# Patient Record
Sex: FEMALE | Race: WHITE | Employment: FULL TIME
[De-identification: names, ages, dates, MRNs, and addresses within clinical notes are randomized per-mention and may not be internally consistent; named-entity substitution may affect disease eponyms.]

---

## 2017-01-26 DIAGNOSIS — N95.1 VAGINAL DRYNESS, MENOPAUSAL: Primary | ICD-10-CM

## 2017-01-26 RX ORDER — ESTRADIOL 0.1 MG/G
1 CREAM VAGINAL SEE ADMIN INSTRUCTIONS
Qty: 1 TUBE | Refills: 2 | Status: SHIPPED | OUTPATIENT
Start: 2017-01-26 | End: 2017-09-18 | Stop reason: SDUPTHER

## 2017-02-17 ENCOUNTER — TELEPHONE (OUTPATIENT)
Dept: OBGYN | Facility: CLINIC | Age: 47
End: 2017-02-17

## 2017-02-20 DIAGNOSIS — N95.1 MENOPAUSAL SYMPTOMS: Primary | ICD-10-CM

## 2017-02-20 DIAGNOSIS — R23.2 HOT FLASHES: ICD-10-CM

## 2017-02-23 ENCOUNTER — HOSPITAL ENCOUNTER (OUTPATIENT)
Age: 47
Setting detail: SPECIMEN
Discharge: HOME | End: 2017-02-23
Payer: COMMERCIAL

## 2017-02-23 DIAGNOSIS — N95.1 MENOPAUSAL SYMPTOMS: ICD-10-CM

## 2017-02-23 DIAGNOSIS — R23.2 HOT FLASHES: ICD-10-CM

## 2017-02-23 DIAGNOSIS — Z00.00 WELL ADULT EXAM: ICD-10-CM

## 2017-02-23 LAB
ABSOLUTE EOS #: 0.1 K/UL (ref 0–0.4)
ABSOLUTE LYMPH #: 2.2 K/UL (ref 1–4.8)
ABSOLUTE MONO #: 0.5 K/UL (ref 0.1–1.2)
ALBUMIN SERPL-MCNC: 4.6 G/DL (ref 3.5–5.2)
ALBUMIN/GLOBULIN RATIO: 1.6 (ref 1–2.5)
ALP BLD-CCNC: 65 U/L (ref 35–104)
ALT SERPL-CCNC: 19 U/L (ref 5–33)
ANION GAP SERPL CALCULATED.3IONS-SCNC: 12 MMOL/L (ref 9–17)
AST SERPL-CCNC: 26 U/L
BASOPHILS # BLD: 0 % (ref 0–2)
BASOPHILS ABSOLUTE: 0 K/UL (ref 0–0.2)
BILIRUB SERPL-MCNC: 0.48 MG/DL (ref 0.3–1.2)
BUN BLDV-MCNC: 12 MG/DL (ref 6–20)
BUN/CREAT BLD: NORMAL (ref 9–20)
CALCIUM SERPL-MCNC: 9.5 MG/DL (ref 8.6–10.4)
CHLORIDE BLD-SCNC: 104 MMOL/L (ref 98–107)
CHOLESTEROL/HDL RATIO: 4.5
CHOLESTEROL: 230 MG/DL
CO2: 28 MMOL/L (ref 20–31)
CREAT SERPL-MCNC: 0.8 MG/DL (ref 0.5–0.9)
DIFFERENTIAL TYPE: NORMAL
EOSINOPHILS RELATIVE PERCENT: 2 % (ref 1–4)
ESTRADIOL LEVEL: <5 PG/ML
GFR AFRICAN AMERICAN: >60 ML/MIN
GFR NON-AFRICAN AMERICAN: >60 ML/MIN
GFR SERPL CREATININE-BSD FRML MDRD: NORMAL ML/MIN/{1.73_M2}
GFR SERPL CREATININE-BSD FRML MDRD: NORMAL ML/MIN/{1.73_M2}
GLUCOSE BLD-MCNC: 92 MG/DL (ref 70–99)
HCT VFR BLD CALC: 39.6 % (ref 36–46)
HDLC SERPL-MCNC: 51 MG/DL
HEMOGLOBIN: 13.6 G/DL (ref 12–16)
LDL CHOLESTEROL: 149 MG/DL (ref 0–130)
LYMPHOCYTES # BLD: 40 % (ref 24–44)
MCH RBC QN AUTO: 29.8 PG (ref 26–34)
MCHC RBC AUTO-ENTMCNC: 34.3 G/DL (ref 31–37)
MCV RBC AUTO: 86.9 FL (ref 80–100)
MONOCYTES # BLD: 10 % (ref 2–11)
PDW BLD-RTO: 13.4 % (ref 12.5–15.4)
PLATELET # BLD: 244 K/UL (ref 140–450)
PLATELET ESTIMATE: NORMAL
PMV BLD AUTO: 8.5 FL (ref 6–12)
POTASSIUM SERPL-SCNC: 4 MMOL/L (ref 3.7–5.3)
PROGESTERONE LEVEL: 0.27 NG/ML
PTH INTACT: 43.3 PG/ML (ref 15–65)
RBC # BLD: 4.56 M/UL (ref 4–5.2)
RBC # BLD: NORMAL 10*6/UL
SEG NEUTROPHILS: 48 % (ref 36–66)
SEGMENTED NEUTROPHILS ABSOLUTE COUNT: 2.6 K/UL (ref 1.8–7.7)
SODIUM BLD-SCNC: 144 MMOL/L (ref 135–144)
TESTOSTERONE TOTAL: 33 NG/DL (ref 20–70)
THYROXINE, FREE: 1.33 NG/DL (ref 0.93–1.7)
TOTAL PROTEIN: 7.4 G/DL (ref 6.4–8.3)
TRIGL SERPL-MCNC: 150 MG/DL
TSH SERPL DL<=0.05 MIU/L-ACNC: 1.63 MIU/L (ref 0.3–5)
VITAMIN D 25-HYDROXY: 33.9 NG/ML (ref 30–100)
VLDLC SERPL CALC-MCNC: ABNORMAL MG/DL (ref 1–30)
WBC # BLD: 5.4 K/UL (ref 3.5–11)
WBC # BLD: NORMAL 10*3/UL

## 2017-02-23 PROCEDURE — 36415 COLL VENOUS BLD VENIPUNCTURE: CPT

## 2017-02-23 PROCEDURE — 86376 MICROSOMAL ANTIBODY EACH: CPT

## 2017-02-23 PROCEDURE — 83970 ASSAY OF PARATHORMONE: CPT

## 2017-02-23 PROCEDURE — 82670 ASSAY OF TOTAL ESTRADIOL: CPT

## 2017-02-23 PROCEDURE — 84443 ASSAY THYROID STIM HORMONE: CPT

## 2017-02-23 PROCEDURE — 82306 VITAMIN D 25 HYDROXY: CPT

## 2017-02-23 PROCEDURE — 85025 COMPLETE CBC W/AUTO DIFF WBC: CPT

## 2017-02-23 PROCEDURE — 84403 ASSAY OF TOTAL TESTOSTERONE: CPT

## 2017-02-23 PROCEDURE — 84144 ASSAY OF PROGESTERONE: CPT

## 2017-02-23 PROCEDURE — 80053 COMPREHEN METABOLIC PANEL: CPT

## 2017-02-23 PROCEDURE — 82679 ASSAY OF ESTRONE: CPT

## 2017-02-23 PROCEDURE — 80061 LIPID PANEL: CPT

## 2017-02-23 PROCEDURE — 84439 ASSAY OF FREE THYROXINE: CPT

## 2017-02-24 LAB — THYROID PEROXIDASE (TPO) AB: <10 IU/ML (ref 0–35)

## 2017-02-25 LAB — ESTRONE: 8.6 PG/ML

## 2017-03-16 ENCOUNTER — OFFICE VISIT (OUTPATIENT)
Dept: FAMILY MEDICINE CLINIC | Age: 47
End: 2017-03-16
Payer: COMMERCIAL

## 2017-03-16 VITALS
HEIGHT: 67 IN | TEMPERATURE: 98.2 F | WEIGHT: 150 LBS | HEART RATE: 65 BPM | RESPIRATION RATE: 16 BRPM | BODY MASS INDEX: 23.54 KG/M2 | SYSTOLIC BLOOD PRESSURE: 112 MMHG | OXYGEN SATURATION: 95 % | DIASTOLIC BLOOD PRESSURE: 72 MMHG

## 2017-03-16 DIAGNOSIS — M54.6 TRIGGER POINT OF THORACIC REGION: Primary | ICD-10-CM

## 2017-03-16 PROCEDURE — 99213 OFFICE O/P EST LOW 20 MIN: CPT | Performed by: FAMILY MEDICINE

## 2017-03-16 PROCEDURE — 20552 NJX 1/MLT TRIGGER POINT 1/2: CPT | Performed by: FAMILY MEDICINE

## 2017-03-16 RX ORDER — METHYLPREDNISOLONE ACETATE 40 MG/ML
40 INJECTION, SUSPENSION INTRA-ARTICULAR; INTRALESIONAL; INTRAMUSCULAR; SOFT TISSUE ONCE
Status: COMPLETED | OUTPATIENT
Start: 2017-03-16 | End: 2017-03-16

## 2017-03-16 RX ADMIN — METHYLPREDNISOLONE ACETATE 40 MG: 40 INJECTION, SUSPENSION INTRA-ARTICULAR; INTRALESIONAL; INTRAMUSCULAR; SOFT TISSUE at 16:24

## 2017-09-13 ENCOUNTER — HOSPITAL ENCOUNTER (OUTPATIENT)
Dept: MAMMOGRAPHY | Age: 47
Discharge: HOME | End: 2017-09-13
Payer: COMMERCIAL

## 2017-09-13 DIAGNOSIS — Z12.31 VISIT FOR SCREENING MAMMOGRAM: ICD-10-CM

## 2017-09-13 PROCEDURE — 77063 BREAST TOMOSYNTHESIS BI: CPT

## 2017-09-18 ENCOUNTER — OFFICE VISIT (OUTPATIENT)
Dept: OBGYN CLINIC | Age: 47
End: 2017-09-18
Payer: COMMERCIAL

## 2017-09-18 ENCOUNTER — HOSPITAL ENCOUNTER (OUTPATIENT)
Age: 47
Setting detail: SPECIMEN
Discharge: HOME | End: 2017-09-18
Payer: COMMERCIAL

## 2017-09-18 VITALS
SYSTOLIC BLOOD PRESSURE: 112 MMHG | DIASTOLIC BLOOD PRESSURE: 70 MMHG | HEIGHT: 67 IN | BODY MASS INDEX: 23.86 KG/M2 | WEIGHT: 152 LBS

## 2017-09-18 DIAGNOSIS — Z01.419 ROUTINE GYNECOLOGICAL EXAMINATION: Primary | ICD-10-CM

## 2017-09-18 DIAGNOSIS — N95.1 VAGINAL DRYNESS, MENOPAUSAL: ICD-10-CM

## 2017-09-18 PROCEDURE — 99396 PREV VISIT EST AGE 40-64: CPT | Performed by: OBSTETRICS & GYNECOLOGY

## 2017-09-18 RX ORDER — ESTRADIOL 0.1 MG/G
1 CREAM VAGINAL SEE ADMIN INSTRUCTIONS
Qty: 1 TUBE | Refills: 2 | Status: SHIPPED | OUTPATIENT
Start: 2017-09-18 | End: 2018-10-02 | Stop reason: ALTCHOICE

## 2017-09-20 LAB
HPV SAMPLE: NORMAL
HPV SOURCE: NORMAL
HPV, GENOTYPE 16: NOT DETECTED
HPV, GENOTYPE 18: NOT DETECTED
HPV, HIGH RISK OTHER: NOT DETECTED
HPV, INTERPRETATION: NORMAL

## 2017-09-22 LAB — CYTOLOGY REPORT: NORMAL

## 2018-05-14 ENCOUNTER — OFFICE VISIT (OUTPATIENT)
Dept: FAMILY MEDICINE CLINIC | Age: 48
End: 2018-05-14
Payer: COMMERCIAL

## 2018-05-14 VITALS
WEIGHT: 153 LBS | DIASTOLIC BLOOD PRESSURE: 78 MMHG | HEIGHT: 67 IN | SYSTOLIC BLOOD PRESSURE: 119 MMHG | HEART RATE: 73 BPM | RESPIRATION RATE: 16 BRPM | BODY MASS INDEX: 24.01 KG/M2 | TEMPERATURE: 97.5 F

## 2018-05-14 DIAGNOSIS — Z13.820 SCREENING FOR OSTEOPOROSIS: ICD-10-CM

## 2018-05-14 DIAGNOSIS — F41.9 ANXIETY: ICD-10-CM

## 2018-05-14 DIAGNOSIS — N95.1 VAGINAL DRYNESS, MENOPAUSAL: Primary | ICD-10-CM

## 2018-05-14 DIAGNOSIS — J02.9 ACUTE VIRAL PHARYNGITIS: ICD-10-CM

## 2018-05-14 PROCEDURE — 99214 OFFICE O/P EST MOD 30 MIN: CPT | Performed by: FAMILY MEDICINE

## 2018-05-14 RX ORDER — ESTRADIOL 1 MG/1
1 TABLET ORAL DAILY
Qty: 90 TABLET | Refills: 3 | Status: SHIPPED | OUTPATIENT
Start: 2018-05-14 | End: 2019-09-06 | Stop reason: SDUPTHER

## 2018-05-14 RX ORDER — ALPRAZOLAM 0.25 MG/1
0.25 TABLET ORAL NIGHTLY PRN
Qty: 30 TABLET | Refills: 0 | Status: SHIPPED | OUTPATIENT
Start: 2018-05-14 | End: 2019-10-14 | Stop reason: SDUPTHER

## 2018-05-14 RX ORDER — ESTRADIOL 0.1 MG/G
1 CREAM VAGINAL SEE ADMIN INSTRUCTIONS
Qty: 1 TUBE | Refills: 2 | Status: CANCELLED | OUTPATIENT
Start: 2018-05-14

## 2018-05-14 ASSESSMENT — PATIENT HEALTH QUESTIONNAIRE - PHQ9
2. FEELING DOWN, DEPRESSED OR HOPELESS: 0
SUM OF ALL RESPONSES TO PHQ9 QUESTIONS 1 & 2: 0
SUM OF ALL RESPONSES TO PHQ QUESTIONS 1-9: 0
1. LITTLE INTEREST OR PLEASURE IN DOING THINGS: 0

## 2018-06-11 ENCOUNTER — HOSPITAL ENCOUNTER (OUTPATIENT)
Dept: MAMMOGRAPHY | Age: 48
Discharge: HOME | End: 2018-06-13
Payer: COMMERCIAL

## 2018-06-11 DIAGNOSIS — Z13.820 SCREENING FOR OSTEOPOROSIS: ICD-10-CM

## 2018-06-11 PROCEDURE — 77080 DXA BONE DENSITY AXIAL: CPT

## 2018-07-05 ENCOUNTER — PATIENT MESSAGE (OUTPATIENT)
Dept: FAMILY MEDICINE CLINIC | Age: 48
End: 2018-07-05

## 2018-07-09 ENCOUNTER — PATIENT MESSAGE (OUTPATIENT)
Dept: FAMILY MEDICINE CLINIC | Age: 48
End: 2018-07-09

## 2018-07-09 DIAGNOSIS — Z00.00 WELL ADULT EXAM: Primary | ICD-10-CM

## 2018-09-07 ENCOUNTER — TELEPHONE (OUTPATIENT)
Dept: FAMILY MEDICINE CLINIC | Age: 48
End: 2018-09-07

## 2018-09-07 RX ORDER — FLUCONAZOLE 150 MG/1
150 TABLET ORAL ONCE
Qty: 2 TABLET | Refills: 0 | Status: CANCELLED | OUTPATIENT
Start: 2018-09-07 | End: 2018-09-07

## 2018-09-07 RX ORDER — FLUCONAZOLE 150 MG/1
150 TABLET ORAL ONCE
Qty: 2 TABLET | Refills: 0 | Status: SHIPPED | OUTPATIENT
Start: 2018-09-07 | End: 2018-09-07

## 2018-09-11 ENCOUNTER — HOSPITAL ENCOUNTER (OUTPATIENT)
Age: 48
Setting detail: SPECIMEN
Discharge: HOME | End: 2018-09-11
Payer: COMMERCIAL

## 2018-09-17 LAB — DERMATOLOGY PATHOLOGY REPORT: NORMAL

## 2018-09-24 ENCOUNTER — HOSPITAL ENCOUNTER (OUTPATIENT)
Age: 48
Setting detail: SPECIMEN
Discharge: HOME | End: 2018-09-24
Payer: COMMERCIAL

## 2018-09-24 ENCOUNTER — OFFICE VISIT (OUTPATIENT)
Dept: FAMILY MEDICINE CLINIC | Age: 48
End: 2018-09-24
Payer: COMMERCIAL

## 2018-09-24 VITALS
DIASTOLIC BLOOD PRESSURE: 60 MMHG | BODY MASS INDEX: 24.01 KG/M2 | WEIGHT: 153 LBS | HEIGHT: 67 IN | SYSTOLIC BLOOD PRESSURE: 113 MMHG | RESPIRATION RATE: 16 BRPM | HEART RATE: 65 BPM

## 2018-09-24 DIAGNOSIS — B37.9 CANDIDIASIS: ICD-10-CM

## 2018-09-24 DIAGNOSIS — B37.9 CANDIDIASIS: Primary | ICD-10-CM

## 2018-09-24 PROCEDURE — 99214 OFFICE O/P EST MOD 30 MIN: CPT | Performed by: FAMILY MEDICINE

## 2018-09-24 RX ORDER — FLUCONAZOLE 150 MG/1
150 TABLET ORAL ONCE
Qty: 2 TABLET | Refills: 0 | Status: SHIPPED | OUTPATIENT
Start: 2018-09-24 | End: 2018-09-24

## 2018-09-24 ASSESSMENT — PATIENT HEALTH QUESTIONNAIRE - PHQ9
1. LITTLE INTEREST OR PLEASURE IN DOING THINGS: 0
SUM OF ALL RESPONSES TO PHQ QUESTIONS 1-9: 0
SUM OF ALL RESPONSES TO PHQ QUESTIONS 1-9: 0
SUM OF ALL RESPONSES TO PHQ9 QUESTIONS 1 & 2: 0
2. FEELING DOWN, DEPRESSED OR HOPELESS: 0

## 2018-09-26 ENCOUNTER — HOSPITAL ENCOUNTER (OUTPATIENT)
Dept: MAMMOGRAPHY | Age: 48
Discharge: HOME | End: 2018-09-28
Payer: COMMERCIAL

## 2018-09-26 DIAGNOSIS — Z12.39 SCREENING BREAST EXAMINATION: ICD-10-CM

## 2018-09-26 PROCEDURE — 77063 BREAST TOMOSYNTHESIS BI: CPT

## 2018-09-27 LAB
CULTURE: NORMAL
Lab: NORMAL
SPECIMEN DESCRIPTION: NORMAL
STATUS: NORMAL

## 2018-10-02 ENCOUNTER — HOSPITAL ENCOUNTER (OUTPATIENT)
Dept: PREADMISSION TESTING | Age: 48
Discharge: HOME | End: 2018-10-06
Payer: COMMERCIAL

## 2018-10-02 VITALS
DIASTOLIC BLOOD PRESSURE: 77 MMHG | HEIGHT: 67 IN | RESPIRATION RATE: 16 BRPM | SYSTOLIC BLOOD PRESSURE: 120 MMHG | WEIGHT: 150 LBS | TEMPERATURE: 97.9 F | BODY MASS INDEX: 23.54 KG/M2 | OXYGEN SATURATION: 97 % | HEART RATE: 57 BPM

## 2018-10-02 LAB
ABSOLUTE EOS #: 0.09 K/UL (ref 0–0.44)
ABSOLUTE IMMATURE GRANULOCYTE: <0.03 K/UL (ref 0–0.3)
ABSOLUTE LYMPH #: 2.23 K/UL (ref 1.1–3.7)
ABSOLUTE MONO #: 0.55 K/UL (ref 0.1–1.2)
BASOPHILS # BLD: 0 % (ref 0–2)
BASOPHILS ABSOLUTE: <0.03 K/UL (ref 0–0.2)
DIFFERENTIAL TYPE: NORMAL
EOSINOPHILS RELATIVE PERCENT: 1 % (ref 1–4)
HCT VFR BLD CALC: 40.2 % (ref 36.3–47.1)
HEMOGLOBIN: 13.1 G/DL (ref 11.9–15.1)
IMMATURE GRANULOCYTES: 0 %
LYMPHOCYTES # BLD: 35 % (ref 24–43)
MCH RBC QN AUTO: 29.4 PG (ref 25.2–33.5)
MCHC RBC AUTO-ENTMCNC: 32.6 G/DL (ref 28.4–34.8)
MCV RBC AUTO: 90.3 FL (ref 82.6–102.9)
MONOCYTES # BLD: 9 % (ref 3–12)
NRBC AUTOMATED: 0 PER 100 WBC
PDW BLD-RTO: 11.9 % (ref 11.8–14.4)
PLATELET # BLD: 237 K/UL (ref 138–453)
PLATELET ESTIMATE: NORMAL
PMV BLD AUTO: 10.5 FL (ref 8.1–13.5)
RBC # BLD: 4.45 M/UL (ref 3.95–5.11)
RBC # BLD: NORMAL 10*6/UL
SEG NEUTROPHILS: 55 % (ref 36–65)
SEGMENTED NEUTROPHILS ABSOLUTE COUNT: 3.46 K/UL (ref 1.5–8.1)
WBC # BLD: 6.4 K/UL (ref 3.5–11.3)
WBC # BLD: NORMAL 10*3/UL

## 2018-10-02 PROCEDURE — 85025 COMPLETE CBC W/AUTO DIFF WBC: CPT

## 2018-10-02 PROCEDURE — 36415 COLL VENOUS BLD VENIPUNCTURE: CPT

## 2018-10-02 RX ORDER — SODIUM CHLORIDE, SODIUM LACTATE, POTASSIUM CHLORIDE, CALCIUM CHLORIDE 600; 310; 30; 20 MG/100ML; MG/100ML; MG/100ML; MG/100ML
1000 INJECTION, SOLUTION INTRAVENOUS CONTINUOUS
Status: CANCELLED | OUTPATIENT
Start: 2018-10-02

## 2018-10-02 RX ORDER — M-VIT,TX,IRON,MINS/CALC/FOLIC 27MG-0.4MG
1 TABLET ORAL DAILY
COMMUNITY

## 2018-10-09 ENCOUNTER — HOSPITAL ENCOUNTER (OUTPATIENT)
Age: 48
Setting detail: OUTPATIENT SURGERY
Discharge: HOME | End: 2018-10-09
Attending: PLASTIC SURGERY | Admitting: PLASTIC SURGERY
Payer: COMMERCIAL

## 2018-10-09 ENCOUNTER — ANESTHESIA (OUTPATIENT)
Dept: OPERATING ROOM | Age: 48
End: 2018-10-09
Payer: COMMERCIAL

## 2018-10-09 ENCOUNTER — ANESTHESIA EVENT (OUTPATIENT)
Dept: OPERATING ROOM | Age: 48
End: 2018-10-09
Payer: COMMERCIAL

## 2018-10-09 VITALS
DIASTOLIC BLOOD PRESSURE: 59 MMHG | RESPIRATION RATE: 17 BRPM | HEART RATE: 71 BPM | HEIGHT: 67 IN | SYSTOLIC BLOOD PRESSURE: 114 MMHG | TEMPERATURE: 97.5 F | WEIGHT: 150 LBS | BODY MASS INDEX: 23.54 KG/M2 | OXYGEN SATURATION: 98 %

## 2018-10-09 VITALS — OXYGEN SATURATION: 97 % | TEMPERATURE: 95.4 F | SYSTOLIC BLOOD PRESSURE: 82 MMHG | DIASTOLIC BLOOD PRESSURE: 49 MMHG

## 2018-10-09 DIAGNOSIS — G89.18 PAIN FOLLOWING SURGERY OR PROCEDURE: Primary | ICD-10-CM

## 2018-10-09 PROCEDURE — 3600000013 HC SURGERY LEVEL 3 ADDTL 15MIN: Performed by: PLASTIC SURGERY

## 2018-10-09 PROCEDURE — 2500000003 HC RX 250 WO HCPCS: Performed by: NURSE ANESTHETIST, CERTIFIED REGISTERED

## 2018-10-09 PROCEDURE — 88305 TISSUE EXAM BY PATHOLOGIST: CPT

## 2018-10-09 PROCEDURE — 6360000002 HC RX W HCPCS: Performed by: NURSE ANESTHETIST, CERTIFIED REGISTERED

## 2018-10-09 PROCEDURE — 7100000001 HC PACU RECOVERY - ADDTL 15 MIN: Performed by: PLASTIC SURGERY

## 2018-10-09 PROCEDURE — 2500000003 HC RX 250 WO HCPCS: Performed by: PLASTIC SURGERY

## 2018-10-09 PROCEDURE — 3600000003 HC SURGERY LEVEL 3 BASE: Performed by: PLASTIC SURGERY

## 2018-10-09 PROCEDURE — 2580000003 HC RX 258: Performed by: PLASTIC SURGERY

## 2018-10-09 PROCEDURE — 2580000003 HC RX 258: Performed by: ANESTHESIOLOGY

## 2018-10-09 PROCEDURE — 6370000000 HC RX 637 (ALT 250 FOR IP): Performed by: ANESTHESIOLOGY

## 2018-10-09 PROCEDURE — 3700000001 HC ADD 15 MINUTES (ANESTHESIA): Performed by: PLASTIC SURGERY

## 2018-10-09 PROCEDURE — 7100000040 HC SPAR PHASE II RECOVERY - FIRST 15 MIN: Performed by: PLASTIC SURGERY

## 2018-10-09 PROCEDURE — 2709999900 HC NON-CHARGEABLE SUPPLY: Performed by: PLASTIC SURGERY

## 2018-10-09 PROCEDURE — 7100000041 HC SPAR PHASE II RECOVERY - ADDTL 15 MIN: Performed by: PLASTIC SURGERY

## 2018-10-09 PROCEDURE — 6360000002 HC RX W HCPCS

## 2018-10-09 PROCEDURE — 7100000000 HC PACU RECOVERY - FIRST 15 MIN: Performed by: PLASTIC SURGERY

## 2018-10-09 PROCEDURE — 3700000000 HC ANESTHESIA ATTENDED CARE: Performed by: PLASTIC SURGERY

## 2018-10-09 PROCEDURE — 6360000002 HC RX W HCPCS: Performed by: ANESTHESIOLOGY

## 2018-10-09 RX ORDER — OXYCODONE HYDROCHLORIDE AND ACETAMINOPHEN 5; 325 MG/1; MG/1
1 TABLET ORAL
Status: COMPLETED | OUTPATIENT
Start: 2018-10-09 | End: 2018-10-09

## 2018-10-09 RX ORDER — LIDOCAINE HYDROCHLORIDE 10 MG/ML
INJECTION, SOLUTION EPIDURAL; INFILTRATION; INTRACAUDAL; PERINEURAL PRN
Status: DISCONTINUED | OUTPATIENT
Start: 2018-10-09 | End: 2018-10-09 | Stop reason: SDUPTHER

## 2018-10-09 RX ORDER — FENTANYL CITRATE 50 UG/ML
INJECTION, SOLUTION INTRAMUSCULAR; INTRAVENOUS PRN
Status: DISCONTINUED | OUTPATIENT
Start: 2018-10-09 | End: 2018-10-09 | Stop reason: SDUPTHER

## 2018-10-09 RX ORDER — BUPIVACAINE HYDROCHLORIDE AND EPINEPHRINE 2.5; 5 MG/ML; UG/ML
INJECTION, SOLUTION EPIDURAL; INFILTRATION; INTRACAUDAL; PERINEURAL PRN
Status: DISCONTINUED | OUTPATIENT
Start: 2018-10-09 | End: 2018-10-09 | Stop reason: HOSPADM

## 2018-10-09 RX ORDER — ONDANSETRON 2 MG/ML
INJECTION INTRAMUSCULAR; INTRAVENOUS PRN
Status: DISCONTINUED | OUTPATIENT
Start: 2018-10-09 | End: 2018-10-09 | Stop reason: SDUPTHER

## 2018-10-09 RX ORDER — DIPHENHYDRAMINE HYDROCHLORIDE 50 MG/ML
INJECTION INTRAMUSCULAR; INTRAVENOUS PRN
Status: DISCONTINUED | OUTPATIENT
Start: 2018-10-09 | End: 2018-10-09 | Stop reason: SDUPTHER

## 2018-10-09 RX ORDER — DIPHENHYDRAMINE HYDROCHLORIDE 50 MG/ML
12.5 INJECTION INTRAMUSCULAR; INTRAVENOUS
Status: DISCONTINUED | OUTPATIENT
Start: 2018-10-09 | End: 2018-10-09 | Stop reason: HOSPADM

## 2018-10-09 RX ORDER — SODIUM CHLORIDE, SODIUM LACTATE, POTASSIUM CHLORIDE, CALCIUM CHLORIDE 600; 310; 30; 20 MG/100ML; MG/100ML; MG/100ML; MG/100ML
1000 INJECTION, SOLUTION INTRAVENOUS CONTINUOUS
Status: DISCONTINUED | OUTPATIENT
Start: 2018-10-09 | End: 2018-10-09 | Stop reason: HOSPADM

## 2018-10-09 RX ORDER — MIDAZOLAM HYDROCHLORIDE 1 MG/ML
INJECTION INTRAMUSCULAR; INTRAVENOUS
Status: COMPLETED
Start: 2018-10-09 | End: 2018-10-09

## 2018-10-09 RX ORDER — MIDAZOLAM HYDROCHLORIDE 1 MG/ML
2 INJECTION INTRAMUSCULAR; INTRAVENOUS ONCE
Status: COMPLETED | OUTPATIENT
Start: 2018-10-09 | End: 2018-10-09

## 2018-10-09 RX ORDER — GLYCOPYRROLATE 1 MG/5 ML
SYRINGE (ML) INTRAVENOUS PRN
Status: DISCONTINUED | OUTPATIENT
Start: 2018-10-09 | End: 2018-10-09 | Stop reason: SDUPTHER

## 2018-10-09 RX ORDER — PROPOFOL 10 MG/ML
INJECTION, EMULSION INTRAVENOUS PRN
Status: DISCONTINUED | OUTPATIENT
Start: 2018-10-09 | End: 2018-10-09 | Stop reason: SDUPTHER

## 2018-10-09 RX ORDER — MAGNESIUM HYDROXIDE 1200 MG/15ML
LIQUID ORAL CONTINUOUS PRN
Status: DISCONTINUED | OUTPATIENT
Start: 2018-10-09 | End: 2018-10-09 | Stop reason: HOSPADM

## 2018-10-09 RX ORDER — ONDANSETRON 2 MG/ML
4 INJECTION INTRAMUSCULAR; INTRAVENOUS
Status: DISCONTINUED | OUTPATIENT
Start: 2018-10-09 | End: 2018-10-09 | Stop reason: HOSPADM

## 2018-10-09 RX ORDER — MIDAZOLAM HYDROCHLORIDE 1 MG/ML
INJECTION INTRAMUSCULAR; INTRAVENOUS PRN
Status: DISCONTINUED | OUTPATIENT
Start: 2018-10-09 | End: 2018-10-09 | Stop reason: SDUPTHER

## 2018-10-09 RX ORDER — OXYCODONE HYDROCHLORIDE AND ACETAMINOPHEN 5; 325 MG/1; MG/1
1 TABLET ORAL EVERY 6 HOURS PRN
Qty: 28 TABLET | Refills: 0 | Status: SHIPPED | OUTPATIENT
Start: 2018-10-09 | End: 2018-10-16

## 2018-10-09 RX ORDER — FENTANYL CITRATE 50 UG/ML
25 INJECTION, SOLUTION INTRAMUSCULAR; INTRAVENOUS EVERY 5 MIN PRN
Status: DISCONTINUED | OUTPATIENT
Start: 2018-10-09 | End: 2018-10-09 | Stop reason: HOSPADM

## 2018-10-09 RX ORDER — DEXAMETHASONE SODIUM PHOSPHATE 10 MG/ML
INJECTION INTRAMUSCULAR; INTRAVENOUS PRN
Status: DISCONTINUED | OUTPATIENT
Start: 2018-10-09 | End: 2018-10-09 | Stop reason: SDUPTHER

## 2018-10-09 RX ORDER — FENTANYL CITRATE 50 UG/ML
50 INJECTION, SOLUTION INTRAMUSCULAR; INTRAVENOUS EVERY 5 MIN PRN
Status: DISCONTINUED | OUTPATIENT
Start: 2018-10-09 | End: 2018-10-09 | Stop reason: HOSPADM

## 2018-10-09 RX ORDER — CEPHALEXIN 500 MG/1
500 CAPSULE ORAL 3 TIMES DAILY
Qty: 21 CAPSULE | Refills: 0 | Status: SHIPPED | OUTPATIENT
Start: 2018-10-09 | End: 2018-10-16

## 2018-10-09 RX ADMIN — MIDAZOLAM HYDROCHLORIDE 2 MG: 1 INJECTION, SOLUTION INTRAMUSCULAR; INTRAVENOUS at 11:24

## 2018-10-09 RX ADMIN — MIDAZOLAM HYDROCHLORIDE 2 MG: 1 INJECTION, SOLUTION INTRAMUSCULAR; INTRAVENOUS at 11:40

## 2018-10-09 RX ADMIN — Medication 0.2 MG: at 12:07

## 2018-10-09 RX ADMIN — Medication 0.1 MG: at 12:03

## 2018-10-09 RX ADMIN — FENTANYL CITRATE 25 MCG: 50 INJECTION INTRAMUSCULAR; INTRAVENOUS at 12:50

## 2018-10-09 RX ADMIN — Medication 0.1 MG: at 12:05

## 2018-10-09 RX ADMIN — FENTANYL CITRATE 25 MCG: 50 INJECTION INTRAMUSCULAR; INTRAVENOUS at 12:38

## 2018-10-09 RX ADMIN — OXYCODONE HYDROCHLORIDE AND ACETAMINOPHEN 1 TABLET: 5; 325 TABLET ORAL at 13:38

## 2018-10-09 RX ADMIN — FENTANYL CITRATE 25 MCG: 50 INJECTION INTRAMUSCULAR; INTRAVENOUS at 13:25

## 2018-10-09 RX ADMIN — FENTANYL CITRATE 25 MCG: 50 INJECTION INTRAMUSCULAR; INTRAVENOUS at 11:54

## 2018-10-09 RX ADMIN — FENTANYL CITRATE 25 MCG: 50 INJECTION INTRAMUSCULAR; INTRAVENOUS at 13:07

## 2018-10-09 RX ADMIN — ONDANSETRON 4 MG: 2 INJECTION, SOLUTION INTRAMUSCULAR; INTRAVENOUS at 12:33

## 2018-10-09 RX ADMIN — SODIUM CHLORIDE, POTASSIUM CHLORIDE, SODIUM LACTATE AND CALCIUM CHLORIDE 1000 ML: 600; 310; 30; 20 INJECTION, SOLUTION INTRAVENOUS at 10:38

## 2018-10-09 RX ADMIN — DIPHENHYDRAMINE HYDROCHLORIDE 12.5 MG: 50 INJECTION, SOLUTION INTRAMUSCULAR; INTRAVENOUS at 11:51

## 2018-10-09 RX ADMIN — ONDANSETRON 4 MG: 2 INJECTION, SOLUTION INTRAMUSCULAR; INTRAVENOUS at 11:51

## 2018-10-09 RX ADMIN — LIDOCAINE HYDROCHLORIDE 50 MG: 10 INJECTION, SOLUTION EPIDURAL; INFILTRATION; INTRACAUDAL; PERINEURAL at 11:45

## 2018-10-09 RX ADMIN — PROPOFOL 200 MG: 10 INJECTION, EMULSION INTRAVENOUS at 11:45

## 2018-10-09 RX ADMIN — DEXAMETHASONE SODIUM PHOSPHATE 10 MG: 10 INJECTION INTRAMUSCULAR; INTRAVENOUS at 11:51

## 2018-10-09 RX ADMIN — FENTANYL CITRATE 25 MCG: 50 INJECTION INTRAMUSCULAR; INTRAVENOUS at 12:01

## 2018-10-09 RX ADMIN — CEFAZOLIN SODIUM 2 G: 2 SOLUTION INTRAVENOUS at 11:59

## 2018-10-09 RX ADMIN — MIDAZOLAM HYDROCHLORIDE 2 MG: 1 INJECTION INTRAMUSCULAR; INTRAVENOUS at 11:24

## 2018-10-09 ASSESSMENT — PULMONARY FUNCTION TESTS
PIF_VALUE: 7
PIF_VALUE: 0
PIF_VALUE: 5
PIF_VALUE: 18
PIF_VALUE: 6
PIF_VALUE: 2
PIF_VALUE: 6
PIF_VALUE: 3
PIF_VALUE: 6
PIF_VALUE: 1
PIF_VALUE: 4
PIF_VALUE: 15
PIF_VALUE: 4
PIF_VALUE: 0
PIF_VALUE: 6
PIF_VALUE: 6
PIF_VALUE: 2
PIF_VALUE: 7
PIF_VALUE: 7
PIF_VALUE: 4
PIF_VALUE: 4
PIF_VALUE: 6
PIF_VALUE: 4
PIF_VALUE: 7
PIF_VALUE: 7
PIF_VALUE: 3
PIF_VALUE: 7
PIF_VALUE: 2
PIF_VALUE: 6
PIF_VALUE: 7
PIF_VALUE: 6
PIF_VALUE: 4
PIF_VALUE: 0
PIF_VALUE: 7
PIF_VALUE: 7
PIF_VALUE: 6
PIF_VALUE: 6
PIF_VALUE: 2
PIF_VALUE: 5
PIF_VALUE: 7
PIF_VALUE: 4
PIF_VALUE: 1
PIF_VALUE: 6
PIF_VALUE: 1
PIF_VALUE: 6
PIF_VALUE: 7
PIF_VALUE: 5
PIF_VALUE: 6
PIF_VALUE: 4
PIF_VALUE: 6
PIF_VALUE: 6
PIF_VALUE: 7
PIF_VALUE: 6
PIF_VALUE: 1
PIF_VALUE: 7
PIF_VALUE: 4
PIF_VALUE: 2
PIF_VALUE: 2
PIF_VALUE: 4
PIF_VALUE: 5
PIF_VALUE: 2
PIF_VALUE: 4

## 2018-10-09 ASSESSMENT — ENCOUNTER SYMPTOMS
SHORTNESS OF BREATH: 0
STRIDOR: 0

## 2018-10-09 ASSESSMENT — PAIN DESCRIPTION - LOCATION
LOCATION: BACK

## 2018-10-09 ASSESSMENT — PAIN DESCRIPTION - DESCRIPTORS
DESCRIPTORS: BURNING

## 2018-10-09 ASSESSMENT — PAIN SCALES - GENERAL
PAINLEVEL_OUTOF10: 3
PAINLEVEL_OUTOF10: 3
PAINLEVEL_OUTOF10: 5
PAINLEVEL_OUTOF10: 5
PAINLEVEL_OUTOF10: 2
PAINLEVEL_OUTOF10: 3
PAINLEVEL_OUTOF10: 5
PAINLEVEL_OUTOF10: 3

## 2018-10-09 ASSESSMENT — PAIN DESCRIPTION - PAIN TYPE
TYPE: SURGICAL PAIN

## 2018-10-09 ASSESSMENT — PAIN - FUNCTIONAL ASSESSMENT: PAIN_FUNCTIONAL_ASSESSMENT: 0-10

## 2018-10-09 NOTE — H&P (VIEW-ONLY)
History and Physical    Pt Name: Miguelito Guzman  MRN: [id number]  YOB: 1970  Date of evaluation: 10/2/2018    SUBJECTIVE:   History of Chief Complaint:    Patient complains of skin lesions on the low back and posterior auricular on the right. She has had biopsy in the office, scheduled now for excision skin lesion mid lower back and right posterior auricular. Past Medical History    has a past medical history of History of gastroesophageal reflux (GERD); History of stress test; Kidney stones; LA (lupus anticoagulant) disorder ([facility] [address] Melanoma ([facility] [address] and Rheumatoid arthritis(714.0). Past Surgical History   has a past surgical history that includes  section (, ); Hysterectomy (2007); and laparotomy (08). Medications  Prior to Admission medications    Medication Sig Start Date End Date Taking? Authorizing Provider   Multiple Vitamins-Minerals (THERAPEUTIC MULTIVITAMIN-MINERALS) tablet Take 1 tablet by mouth daily   Yes Historical Provider, MD   estradiol (ESTRACE) 1 MG tablet Take 1 tablet by mouth daily 18  Yes Toya Yu,      Allergies  has No Known Allergies. Family History  family history includes Cervical Cancer in her mother; Diabetes in her paternal grandmother; Heart Disease in her father; High Blood Pressure in her mother; Osteoporosis in her mother. Social History   reports that she has never smoked. She has never used smokeless tobacco.   reports that she drinks alcohol. Monthly. reports that she does not use drugs. Marital Status   Occupation [employer] for [employer]:   VITALS:  height is 5' 7\" (1.702 m) and weight is 150 lb (68 kg). Her oral temperature is 97.9 °F (36.6 °C). Her blood pressure is 120/77 and her pulse is 57. Her respiration is 16 and oxygen saturation is 97%. CONSTITUTIONAL:alert & oriented x 3, no acute distress. Very pleasant. SKIN:  Warm and dry, no rashes   HEAD:  Normocephalic, atraumatic   EYES: EOMs intact. EARS:  Hearing grossly WNL. NOSE:  Nares patent. No rhinorrhea   MOUTH/THROAT:  benign  NECK:supple, no lymphadenopathy  LUNGS: Clear to auscultation bilaterally, no wheezes. CARDIOVASCULAR: Heart sounds are normal.  Regular rate and rhythm without murmur. ABDOMEN: soft, non tender, non distended. EXTREMITIES: no edema bilateral lower extremities. Testing:   EKG: in paper chart 2014 WNL (stress test WNL also in chart)  Labs pending: drawn 10/2/2018     IMPRESSIONS:   1. Skin lesions, dysplastic  2. has a past medical history of History of gastroesophageal reflux (GERD); History of stress test (2014); Kidney stones (2010 7 2016); LA (lupus anticoagulant) disorder ([facility] [address] (1998); Melanoma ([facility] [address] (09/2018); and Rheumatoid arthritis(714.0) (1996). PLANS:   1.  Excision of mid lower back skin lesion, also right posterior auricular lesion    PARISH ROBERTS PA-C  Electronically signed 10/2/2018 at 9:43 AM

## 2018-10-11 LAB — DERMATOLOGY PATHOLOGY REPORT: NORMAL

## 2019-01-28 ENCOUNTER — HOSPITAL ENCOUNTER (OUTPATIENT)
Age: 49
Setting detail: SPECIMEN
Discharge: HOME | End: 2019-01-28
Payer: COMMERCIAL

## 2019-02-01 LAB — DERMATOLOGY PATHOLOGY REPORT: NORMAL

## 2019-02-05 ENCOUNTER — OFFICE VISIT (OUTPATIENT)
Dept: OBGYN CLINIC | Age: 49
End: 2019-02-05
Payer: COMMERCIAL

## 2019-02-05 VITALS
BODY MASS INDEX: 24.17 KG/M2 | WEIGHT: 154 LBS | DIASTOLIC BLOOD PRESSURE: 72 MMHG | HEIGHT: 67 IN | SYSTOLIC BLOOD PRESSURE: 108 MMHG

## 2019-02-05 DIAGNOSIS — Z12.39 SCREENING FOR BREAST CANCER: ICD-10-CM

## 2019-02-05 DIAGNOSIS — Z01.419 WELL FEMALE EXAM WITH ROUTINE GYNECOLOGICAL EXAM: Primary | ICD-10-CM

## 2019-02-05 PROCEDURE — 99396 PREV VISIT EST AGE 40-64: CPT | Performed by: OBSTETRICS & GYNECOLOGY

## 2019-04-17 ENCOUNTER — OFFICE VISIT (OUTPATIENT)
Dept: PODIATRY | Age: 49
End: 2019-04-17
Payer: COMMERCIAL

## 2019-04-17 VITALS — BODY MASS INDEX: 24.17 KG/M2 | RESPIRATION RATE: 16 BRPM | HEIGHT: 67 IN | WEIGHT: 154 LBS

## 2019-04-17 DIAGNOSIS — G57.91 NEURITIS OF RIGHT FOOT: Primary | ICD-10-CM

## 2019-04-17 DIAGNOSIS — R26.2 DIFFICULTY WALKING: ICD-10-CM

## 2019-04-17 DIAGNOSIS — M79.604 PAIN OF RIGHT LOWER EXTREMITY: ICD-10-CM

## 2019-04-17 PROCEDURE — 99203 OFFICE O/P NEW LOW 30 MIN: CPT | Performed by: PODIATRIST

## 2019-04-17 NOTE — PROGRESS NOTES
[facility]  [facility] [address]  [address] [place] [address]  Dept: [phone]  Dept Fax: [phone]    NEW PATIENT PROGRESS NOTE  Date of patient's visit: 4/17/2019  Patient's Name:  Shoshana Fleming YOB: 1970            Patient Care Team:  Jose Juan Renae DO as PCP - General (Family Medicine)  Jose Juan Renae DO as PCP - [facility] Attributed Provider  Pam Vazquez DO (Obstetrics & Gynecology)  Devan Salcido DPM as Physician (Podiatry)        Chief Complaint   Patient presents with    New Patient    Foot Pain         HPI:   Shoshana Fleming is a 51 y.o. female who presents to the office today complaining of  Right heel pain. Relates pain is on both sides of right heel, mainly on sides not bottom of heel. Relates to getting inserts from store and has helped with pain. Symptoms began 3 month(s) ago. Patient relates pain is Present. Pain is rated 7 out of 10 and is described as constant. Treatments prior to today's visit include: no previous treatment. Currently denies F/C/N/V. Pt's primary care physician is Jose Juan Renae DO last seen 9/24/19    No Known Allergies    Past Medical History:   Diagnosis Date    History of gastroesophageal reflux (GERD)     very mild and intermittent    History of stress test 2014    WNL    Kidney stones 2010 7 2016    X 2 PASSED ON OWN    LA (lupus anticoagulant) disorder (HCC) 1998    Pt also has Anti-RO (SSA-SSB)    Melanoma ([facility] [address] 09/2018    RT LOWER BACK    Melanoma ([facility] [address]     Rheumatoid arthritis(714.0) 1996    HAS ANTIBDIES BUT NO SYMPTOMS       Prior to Admission medications    Medication Sig Start Date End Date Taking?  Authorizing Provider   Multiple Vitamins-Minerals (THERAPEUTIC MULTIVITAMIN-MINERALS) tablet Take 1 tablet by mouth daily    Historical Provider, MD   estradiol (ESTRACE) 1 MG tablet Take 1 tablet by mouth daily 5/14/18   Jose Juan Renae DO       Past Surgical History:   Procedure Laterality Date    Percy Mckeon, 2001    HYSTERECTOMY  7/2007    Dr. Gemini Lopez -JOSHUA + RSO    LAPAROTOMY  7/25/08    Álvarez-EVIE and LSO     MI OFFICE/OUTPT VISIT,PROCEDURE ONLY N/A 10/9/2018    EXCISION SKIN LESION MID LOWER BACK, RIGHT POST. AURICULAR performed by Esme Hawkins MD at [address] N/A 10/09/2018    mid lower back and right post auricular       Family History   Problem Relation Age of Onset    Osteoporosis Mother     Cervical Cancer Mother     High Blood Pressure Mother     Heart Disease Father     Diabetes Paternal Grandmother        Social History     Tobacco Use    Smoking status: Never Smoker    Smokeless tobacco: Never Used   Substance Use Topics    Alcohol use: Yes     Comment: WINE 3 GLASSES A MONTH       Review of Systems    Review of Systems:   History obtained from chart review and the patient  General ROS: negative for - chills, fatigue, fever, night sweats or weight gain  Constitutional: Negative for chills, diaphoresis, fatigue, fever and unexpected weight change. Musculoskeletal: Positive for arthralgias, gait problem and joint swelling. Neurological ROS: negative for - behavioral changes, confusion, headaches or seizures. Negative for weakness and numbness. Dermatological ROS: negative for - mole changes, rash  Cardiovascular: Negative for leg swelling. Gastrointestinal: Negative for constipation, diarrhea, nausea and vomiting. Lower Extremity Physical Examination:   Vitals:   Vitals:    04/17/19 0807   Resp: 16     General: AAO x 3 in NAD. Dermatologic Exam:  Skin lesion/ulceration Absent . Skin No rashes or nodules noted. .       Musculoskeletal:     1st MPJ ROM decreased, Bilateral.  Muscle strength 5/5, Bilateral.  Pain present upon palpation of medial and lateral calcaneus. +POP along ATFL and CFL. Medial longitudinal arch, Bilateral WNL. Ankle ROM painful, right.     Dorsally contracted digits absent digits 1-5 Bilateral. Vascular: DP and PT pulses palpable 2/4, Bilateral.  CFT <3 seconds, Bilateral.  Hair growth present to the level of the digits, Bilateral.  Edema absent, Bilateral.  Varicosities absent, Bilateral. Erythema absent, Bilateral    Neurological: Sensation intact to light touch to level of digits, Bilateral.  Protective sensation intact 10/10 sites via 5.07/10g Salix-Nasim Monofilament, Bilateral.  negative Tinel's, Bilateral.  negative Valleix sign, Bilateral.      Integument: Warm, dry, supple, Bilateral.  Open lesion absent, Bilateral.  Interdigital maceration absent to web spaces 1-4, Bilateral.  Nails are normal in length, thickness and color 1-5 bilateral.  Fissures absent, Bilateral.     Asessment: Patient is a 51 y.o. female with:    Diagnosis Orders   1. Neuritis of right foot     2. First degree ankle sprain, right, initial encounter     3. Pain of right lower extremity     4. Difficulty walking           Plan: Patient examined and evaluated. Current condition and treatment options discussed in detail. Discussed conservative and surgical options with the patient. Advised pt to wear orthotics at all times when walking. Recommend ASO brace for ankle stabilization. Discussed with patient that stretching and icing at home well help with flexibility and decrease pain. Verbal and written instructions given to patient. Contact office with any questions/problems/concerns. RTC in PRN.     4/17/2019    Electronically signed by Codi Montenegro DPM on 4/17/2019 at 8:13 AM  4/17/2019

## 2019-05-13 PROBLEM — D48.5 NEOPLASM OF UNCERTAIN BEHAVIOR OF SKIN: Status: ACTIVE | Noted: 2019-05-13

## 2019-05-13 PROBLEM — D03.9 MELANOMA IN SITU (HCC): Status: ACTIVE | Noted: 2019-05-13

## 2019-06-10 ENCOUNTER — HOSPITAL ENCOUNTER (OUTPATIENT)
Age: 49
Setting detail: SPECIMEN
Discharge: HOME | End: 2019-06-10
Payer: COMMERCIAL

## 2019-06-10 ENCOUNTER — HOSPITAL ENCOUNTER (OUTPATIENT)
Facility: CLINIC | Age: 49
Setting detail: OUTPATIENT SURGERY
Discharge: HOME | End: 2019-06-10
Attending: PLASTIC SURGERY | Admitting: PLASTIC SURGERY
Payer: COMMERCIAL

## 2019-06-10 VITALS
OXYGEN SATURATION: 96 % | RESPIRATION RATE: 16 BRPM | TEMPERATURE: 97.5 F | BODY MASS INDEX: 23.54 KG/M2 | HEIGHT: 67 IN | DIASTOLIC BLOOD PRESSURE: 72 MMHG | WEIGHT: 150 LBS | HEART RATE: 57 BPM | SYSTOLIC BLOOD PRESSURE: 130 MMHG

## 2019-06-10 DIAGNOSIS — Z98.890 POST-OPERATIVE STATE: Primary | ICD-10-CM

## 2019-06-10 PROCEDURE — 7100000010 HC PHASE II RECOVERY - FIRST 15 MIN: Performed by: PLASTIC SURGERY

## 2019-06-10 PROCEDURE — 2709999900 HC NON-CHARGEABLE SUPPLY: Performed by: PLASTIC SURGERY

## 2019-06-10 PROCEDURE — 3600000002 HC SURGERY LEVEL 2 BASE: Performed by: PLASTIC SURGERY

## 2019-06-10 PROCEDURE — 2500000003 HC RX 250 WO HCPCS: Performed by: PLASTIC SURGERY

## 2019-06-10 PROCEDURE — 3600000012 HC SURGERY LEVEL 2 ADDTL 15MIN: Performed by: PLASTIC SURGERY

## 2019-06-10 RX ORDER — OXYCODONE HYDROCHLORIDE AND ACETAMINOPHEN 5; 325 MG/1; MG/1
1 TABLET ORAL EVERY 6 HOURS PRN
Qty: 6 TABLET | Refills: 0 | Status: SHIPPED | OUTPATIENT
Start: 2019-06-10 | End: 2019-06-12

## 2019-06-10 RX ORDER — BUPIVACAINE HYDROCHLORIDE AND EPINEPHRINE 2.5; 5 MG/ML; UG/ML
INJECTION, SOLUTION EPIDURAL; INFILTRATION; INTRACAUDAL; PERINEURAL PRN
Status: DISCONTINUED | OUTPATIENT
Start: 2019-06-10 | End: 2019-06-10 | Stop reason: ALTCHOICE

## 2019-06-10 RX ORDER — CEPHALEXIN 500 MG/1
500 CAPSULE ORAL 3 TIMES DAILY
Qty: 15 CAPSULE | Refills: 0 | Status: SHIPPED | OUTPATIENT
Start: 2019-06-10 | End: 2019-06-15

## 2019-06-10 ASSESSMENT — PAIN - FUNCTIONAL ASSESSMENT: PAIN_FUNCTIONAL_ASSESSMENT: 0-10

## 2019-06-10 NOTE — BRIEF OP NOTE
Brief Postoperative Note  ______________________________________________________________     Patient:Clarita Akers  YOB: 1970  LEV:[id number]  Date of Procedure: 06/10/19    Pre-Op Diagnosis: Multiple hyperpigmented skin lesions concerning for malignancy     Post-Op Diagnosis: same       Procedure(s): Excision of multiple skin lesions: upper abdomen (middle and left lateral), left inner thigh, right inner thigh, and upper back. Anesthesia: Local     Surgeon(s): Dr. Eulalio Nation    Assistant: Darling Staton DO PGY1     Estimated Blood Loss (mL): less than 5 ml    IVF: none     Complications: none     Specimens: multiple skin lesions: upper abdomen (middle and left lateral), left inner thigh, right inner thigh, and upper back     Implants: none    Drains: none     Findings: Mulitple skin lesions concerning for malignancy were seen. She has an exophytic hyperpigmented lesion with irregular borders on the upper right back.  She has two hyperpigmented lesions with irregular borders on the upper abdomen near the xyphoid.  She also has a hyperpigmented lesion with irregular borders on the medial upper right thigh and medial upper left thigh.     Darling Staton DO  Ob/Gyn Resident   Herlinda  [id number]:89 AM

## 2019-06-12 LAB — DERMATOLOGY PATHOLOGY REPORT: NORMAL

## 2019-06-17 NOTE — OP NOTE
patient understands all the above and wishes to proceed. PROCEDURE:  The patient was laid in the supine then prone position and the tissue surrounding each lesion was prepped and draped in sterile fashion. Local anesthetic using 0.25% marcaine with epinephrine was injected into the tissue surrounding the lesions. The lesions were each excised with a #15 blade and submitted for specimen. The incisions were closed in a multiple layered fashion using 4-0 monocry in the deep layer and 4-0 monocryl in the dermal layer and 4-0 monocryl as a running subcuticular closure. Steri strips were applied, and wound care instructions provided. The procedure was well tolerated without complications. Pt will call with any questions or problems prior to follow up.

## 2019-06-24 PROBLEM — D23.9 DYSPLASTIC NEVUS: Status: ACTIVE | Noted: 2019-06-24

## 2019-07-01 ENCOUNTER — OFFICE VISIT (OUTPATIENT)
Dept: FAMILY MEDICINE CLINIC | Age: 49
End: 2019-07-01
Payer: COMMERCIAL

## 2019-07-01 VITALS
TEMPERATURE: 98 F | SYSTOLIC BLOOD PRESSURE: 136 MMHG | HEART RATE: 62 BPM | DIASTOLIC BLOOD PRESSURE: 87 MMHG | OXYGEN SATURATION: 97 % | WEIGHT: 150 LBS | BODY MASS INDEX: 23.49 KG/M2

## 2019-07-01 DIAGNOSIS — R19.5 LOOSE STOOLS: ICD-10-CM

## 2019-07-01 DIAGNOSIS — N20.0 RENAL STONES: ICD-10-CM

## 2019-07-01 DIAGNOSIS — M54.5 BILATERAL LOW BACK PAIN, UNSPECIFIED CHRONICITY, WITH SCIATICA PRESENCE UNSPECIFIED: Primary | ICD-10-CM

## 2019-07-01 LAB
BILIRUBIN, POC: NEGATIVE
BLOOD URINE, POC: NEGATIVE
CLARITY, POC: CLEAR
COLOR, POC: YELLOW
GLUCOSE URINE, POC: NEGATIVE
KETONES, POC: NEGATIVE
LEUKOCYTE EST, POC: NEGATIVE
NITRITE, POC: NEGATIVE
PH, POC: 5
PROTEIN, POC: NEGATIVE
SPECIFIC GRAVITY, POC: 1.01
UROBILINOGEN, POC: NORMAL

## 2019-07-01 PROCEDURE — 99214 OFFICE O/P EST MOD 30 MIN: CPT | Performed by: FAMILY MEDICINE

## 2019-07-01 PROCEDURE — 81002 URINALYSIS NONAUTO W/O SCOPE: CPT | Performed by: FAMILY MEDICINE

## 2019-07-01 RX ORDER — TIZANIDINE 2 MG/1
2 TABLET ORAL NIGHTLY PRN
Qty: 10 TABLET | Refills: 0 | Status: ON HOLD | OUTPATIENT
Start: 2019-07-01 | End: 2019-11-21

## 2019-07-01 ASSESSMENT — PATIENT HEALTH QUESTIONNAIRE - PHQ9
2. FEELING DOWN, DEPRESSED OR HOPELESS: 0
1. LITTLE INTEREST OR PLEASURE IN DOING THINGS: 0
SUM OF ALL RESPONSES TO PHQ QUESTIONS 1-9: 0
SUM OF ALL RESPONSES TO PHQ QUESTIONS 1-9: 0
SUM OF ALL RESPONSES TO PHQ9 QUESTIONS 1 & 2: 0

## 2019-07-01 NOTE — PROGRESS NOTES
polycarbophil (FIBERCON) 625 MG tablet Take 625 mg by mouth daily      Multiple Vitamins-Minerals (THERAPEUTIC MULTIVITAMIN-MINERALS) tablet Take 1 tablet by mouth daily      estradiol (ESTRACE) 1 MG tablet Take 1 tablet by mouth daily 90 tablet 3     No current facility-administered medications for this visit. ALLERGIES:  No Known Allergies    Social History     Tobacco Use    Smoking status: Never Smoker    Smokeless tobacco: Never Used   Substance Use Topics    Alcohol use: Yes     Comment: WINE 3 GLASSES A MONTH      Body mass index is 23.49 kg/m². /87   Pulse 62   Temp 98 °F (36.7 °C)   Wt 150 lb (68 kg)   LMP 07/29/2007   SpO2 97%   BMI 23.49 kg/m²     Subjective:      HPI    52 yo female coming in today with multiple multiple complaints. The patient is status post removal of a melanoma in situ at her lower back in October 2018. She tells me that she feels very uncertain of her symptoms because they are very nonspecific but she would like to know what is going on. Back Pain  Patient presents for evaluation of low back problems. Symptoms have been present for several months and include on and off both sides hips and lower back and this week L lower abdomen. And she has renal stones. Initial inciting event: none. Symptoms are worse in the morning, in the middle of the day, in the afternoon, in the evening and at nighttime. Alleviating factors identifiable by the patient are none. Aggravating factors identifiable by the patient are none. Treatments initiated by the patient: ??. Previous lower back problems: none. Previous work up: none. Previous treatments: none. Has been using fibers daily, still has bouts of loose loose stool. Patient tells me that the bowel movements are somewhat improved but then again at least once a week she has this bout of loose stools where she has to stay home she cannot go anywhere.   Patient is concerned that there is more going on than just constipation. Kidney stones in the past. Flank pains on and off. Patient feels that she just passed the stone recent days. But then again she has been having this unspecific left-sided flank pain. No need for Gyn due to previous hysterectomy and oophorectomy. Review of Systems   Constitutional: Negative for fever and unexpected weight change. Respiratory: Negative for cough and shortness of breath. Cardiovascular: Negative for chest pain and leg swelling. Gastrointestinal: Negative for diarrhea, constipation and blood in stool.  + bouts of loose stools. Musculoskeletal: Negative for back pain and gait problem. Positive for left flank pain positive for hip pain positive for left lower abdominal discomfort. Skin: Negative for color change and rash. Neurological: Negative for dizziness and headaches. Psychiatric/Behavioral: Negative for confusion and agitation. Objective:   Physical Exam  Constitutional: VS (see above). General appearance: normal development, habitus and attention, no deformities. No distress. Eyes: normal conjunctiva and lids. CAV: RRR, no RMG. No edema lower extremities. Pulmo: CTA bilateral, no CWR. Skin: no rashes, lesions or ulcers. Mid lower back in this and around 6 cm in length well-healed scar present. Musculoskeletal: normal gait. Nails: no clubbing or cyanosis. Psychiatric: alert and oriented to place, time and person. Normal mood and affect.     Urinalysis Results:   Lab Results   Component Value Date    GLUCOSEU negative 07/01/2019    GLUCOSEU NEGATIVE 01/20/2016    BILIRUBINUR negative 07/01/2019    KETUA negative 07/01/2019    KETUA NEGATIVE 01/20/2016    SPECGRAV 1.010 07/01/2019    SPECGRAV 1.024 01/20/2016    BLOODU negative 07/01/2019    PROTEINU negative 07/01/2019    PROTEINU NEGATIVE 01/20/2016    PHUR 5 07/01/2019    PHUR 7.0 01/20/2016    UROBILIU normal 07/01/2019    LEUKOCYTESUR negative 07/01/2019    LEUKOCYTESUR NEGATIVE 01/20/2016

## 2019-07-02 ENCOUNTER — HOSPITAL ENCOUNTER (OUTPATIENT)
Dept: GENERAL RADIOLOGY | Age: 49
Discharge: HOME | End: 2019-07-04
Payer: COMMERCIAL

## 2019-07-02 ENCOUNTER — HOSPITAL ENCOUNTER (OUTPATIENT)
Age: 49
Discharge: HOME | End: 2019-07-04
Payer: COMMERCIAL

## 2019-07-02 ENCOUNTER — HOSPITAL ENCOUNTER (OUTPATIENT)
Age: 49
Discharge: HOME | End: 2019-07-02
Payer: COMMERCIAL

## 2019-07-02 ENCOUNTER — HOSPITAL ENCOUNTER (OUTPATIENT)
Dept: ULTRASOUND IMAGING | Age: 49
Discharge: HOME | End: 2019-07-04
Payer: COMMERCIAL

## 2019-07-02 DIAGNOSIS — N20.0 RENAL STONES: ICD-10-CM

## 2019-07-02 DIAGNOSIS — M54.5 BILATERAL LOW BACK PAIN, UNSPECIFIED CHRONICITY, WITH SCIATICA PRESENCE UNSPECIFIED: ICD-10-CM

## 2019-07-02 DIAGNOSIS — Z00.00 WELL ADULT EXAM: ICD-10-CM

## 2019-07-02 LAB
ABSOLUTE EOS #: 0.13 K/UL (ref 0–0.44)
ABSOLUTE IMMATURE GRANULOCYTE: <0.03 K/UL (ref 0–0.3)
ABSOLUTE LYMPH #: 2.33 K/UL (ref 1.1–3.7)
ABSOLUTE MONO #: 0.47 K/UL (ref 0.1–1.2)
ALBUMIN SERPL-MCNC: 4.2 G/DL (ref 3.5–5.2)
ALBUMIN/GLOBULIN RATIO: 1.5 (ref 1–2.5)
ALP BLD-CCNC: 50 U/L (ref 35–104)
ALT SERPL-CCNC: 13 U/L (ref 5–33)
ANION GAP SERPL CALCULATED.3IONS-SCNC: 12 MMOL/L (ref 9–17)
AST SERPL-CCNC: 18 U/L
BASOPHILS # BLD: 1 % (ref 0–2)
BASOPHILS ABSOLUTE: 0.04 K/UL (ref 0–0.2)
BILIRUB SERPL-MCNC: 0.32 MG/DL (ref 0.3–1.2)
BUN BLDV-MCNC: 12 MG/DL (ref 6–20)
BUN/CREAT BLD: ABNORMAL (ref 9–20)
CALCIUM SERPL-MCNC: 8.7 MG/DL (ref 8.6–10.4)
CHLORIDE BLD-SCNC: 110 MMOL/L (ref 98–107)
CHOLESTEROL/HDL RATIO: 4.7
CHOLESTEROL: 220 MG/DL
CO2: 24 MMOL/L (ref 20–31)
CREAT SERPL-MCNC: 0.65 MG/DL (ref 0.5–0.9)
DIFFERENTIAL TYPE: ABNORMAL
EOSINOPHILS RELATIVE PERCENT: 2 % (ref 1–4)
GFR AFRICAN AMERICAN: >60 ML/MIN
GFR NON-AFRICAN AMERICAN: >60 ML/MIN
GFR SERPL CREATININE-BSD FRML MDRD: ABNORMAL ML/MIN/{1.73_M2}
GFR SERPL CREATININE-BSD FRML MDRD: ABNORMAL ML/MIN/{1.73_M2}
GLUCOSE BLD-MCNC: 90 MG/DL (ref 70–99)
HCT VFR BLD CALC: 42.6 % (ref 36.3–47.1)
HDLC SERPL-MCNC: 47 MG/DL
HEMOGLOBIN: 13.8 G/DL (ref 11.9–15.1)
IMMATURE GRANULOCYTES: 0 %
LDL CHOLESTEROL: 139 MG/DL (ref 0–130)
LYMPHOCYTES # BLD: 44 % (ref 24–43)
MCH RBC QN AUTO: 29.7 PG (ref 25.2–33.5)
MCHC RBC AUTO-ENTMCNC: 32.4 G/DL (ref 28.4–34.8)
MCV RBC AUTO: 91.6 FL (ref 82.6–102.9)
MONOCYTES # BLD: 9 % (ref 3–12)
NRBC AUTOMATED: 0 PER 100 WBC
PDW BLD-RTO: 11.9 % (ref 11.8–14.4)
PLATELET # BLD: 248 K/UL (ref 138–453)
PLATELET ESTIMATE: ABNORMAL
PMV BLD AUTO: 11 FL (ref 8.1–13.5)
POTASSIUM SERPL-SCNC: 4.3 MMOL/L (ref 3.7–5.3)
RBC # BLD: 4.65 M/UL (ref 3.95–5.11)
RBC # BLD: ABNORMAL 10*6/UL
SEG NEUTROPHILS: 44 % (ref 36–65)
SEGMENTED NEUTROPHILS ABSOLUTE COUNT: 2.35 K/UL (ref 1.5–8.1)
SODIUM BLD-SCNC: 146 MMOL/L (ref 135–144)
THYROXINE, FREE: 1.19 NG/DL (ref 0.93–1.7)
TOTAL PROTEIN: 7 G/DL (ref 6.4–8.3)
TRIGL SERPL-MCNC: 171 MG/DL
TSH SERPL DL<=0.05 MIU/L-ACNC: 2.14 MIU/L (ref 0.3–5)
VITAMIN D 25-HYDROXY: 34.7 NG/ML (ref 30–100)
VLDLC SERPL CALC-MCNC: ABNORMAL MG/DL (ref 1–30)
WBC # BLD: 5.3 K/UL (ref 3.5–11.3)
WBC # BLD: ABNORMAL 10*3/UL

## 2019-07-02 PROCEDURE — 80053 COMPREHEN METABOLIC PANEL: CPT

## 2019-07-02 PROCEDURE — 80061 LIPID PANEL: CPT

## 2019-07-02 PROCEDURE — 84443 ASSAY THYROID STIM HORMONE: CPT

## 2019-07-02 PROCEDURE — 82306 VITAMIN D 25 HYDROXY: CPT

## 2019-07-02 PROCEDURE — 76770 US EXAM ABDO BACK WALL COMP: CPT

## 2019-07-02 PROCEDURE — 72100 X-RAY EXAM L-S SPINE 2/3 VWS: CPT

## 2019-07-02 PROCEDURE — 85025 COMPLETE CBC W/AUTO DIFF WBC: CPT

## 2019-07-02 PROCEDURE — 73502 X-RAY EXAM HIP UNI 2-3 VIEWS: CPT

## 2019-07-02 PROCEDURE — 84439 ASSAY OF FREE THYROXINE: CPT

## 2019-07-02 PROCEDURE — 36415 COLL VENOUS BLD VENIPUNCTURE: CPT

## 2019-07-03 NOTE — RESULT ENCOUNTER NOTE
Mild degenerative changes present but nothing really seen. There is a possible spasm present in her lumbar area. Again there were more studies ordered. Patient needs to follow-up with the other studies if her discomfort pain does not improve she needs to be seen back by Dr. Milo Bartholomew. Thank you.

## 2019-07-23 ENCOUNTER — HOSPITAL ENCOUNTER (OUTPATIENT)
Age: 49
Setting detail: SPECIMEN
Discharge: HOME | End: 2019-07-23
Payer: COMMERCIAL

## 2019-07-23 LAB — IGA: 163 MG/DL (ref 70–400)

## 2019-07-24 LAB
TISSUE TRANSGLUTAMINASE ANTIBODY IGG: <0.6 U/ML
TISSUE TRANSGLUTAMINASE IGA: 0.4 U/ML

## 2019-08-04 ENCOUNTER — HOSPITAL ENCOUNTER (OUTPATIENT)
Age: 49
Setting detail: SPECIMEN
Discharge: HOME | End: 2019-08-04
Payer: COMMERCIAL

## 2019-08-07 LAB
C DIFF AG + TOXIN: NEGATIVE
CAMPYLOBACTER PCR: NORMAL
DIRECT EXAM: NORMAL
DIRECT EXAM: NORMAL
E COLI ENTEROTOXIGENIC PCR: NORMAL
Lab: NORMAL
PLESIOMONAS SHIGELLOIDES PCR: NORMAL
SALMONELLA PCR: NORMAL
SHIGATOXIN GENE PCR: NORMAL
SHIGELLA SP PCR: NORMAL
SPECIMEN DESCRIPTION: NORMAL
VIBRIO PCR: NORMAL
YERSINIA ENTEROCOLITICA PCR: NORMAL

## 2019-08-08 LAB
CALPROTECTIN, FECAL: <16 UG/G
FECAL PANCREATIC ELASTASE-1: >500 UG/G

## 2019-08-26 ENCOUNTER — HOSPITAL ENCOUNTER (OUTPATIENT)
Age: 49
Setting detail: SPECIMEN
Discharge: HOME | End: 2019-08-26
Payer: COMMERCIAL

## 2019-08-30 LAB — SURGICAL PATHOLOGY REPORT: NORMAL

## 2019-09-06 DIAGNOSIS — N95.1 VAGINAL DRYNESS, MENOPAUSAL: ICD-10-CM

## 2019-09-07 RX ORDER — ESTRADIOL 1 MG/1
TABLET ORAL
Qty: 90 TABLET | Refills: 3 | Status: SHIPPED | OUTPATIENT
Start: 2019-09-07 | End: 2020-11-05

## 2019-10-14 ENCOUNTER — OFFICE VISIT (OUTPATIENT)
Dept: FAMILY MEDICINE CLINIC | Age: 49
End: 2019-10-14
Payer: COMMERCIAL

## 2019-10-14 VITALS
OXYGEN SATURATION: 99 % | HEIGHT: 67 IN | HEART RATE: 64 BPM | BODY MASS INDEX: 24.83 KG/M2 | WEIGHT: 158.2 LBS | DIASTOLIC BLOOD PRESSURE: 65 MMHG | RESPIRATION RATE: 16 BRPM | SYSTOLIC BLOOD PRESSURE: 108 MMHG

## 2019-10-14 DIAGNOSIS — F51.01 PRIMARY INSOMNIA: ICD-10-CM

## 2019-10-14 DIAGNOSIS — N20.0 KIDNEY STONES: Primary | ICD-10-CM

## 2019-10-14 DIAGNOSIS — F41.9 ANXIETY: ICD-10-CM

## 2019-10-14 PROCEDURE — 99214 OFFICE O/P EST MOD 30 MIN: CPT | Performed by: FAMILY MEDICINE

## 2019-10-14 RX ORDER — TAMSULOSIN HYDROCHLORIDE 0.4 MG/1
0.4 CAPSULE ORAL DAILY
Qty: 30 CAPSULE | Refills: 5 | Status: SHIPPED | OUTPATIENT
Start: 2019-10-14 | End: 2020-04-20 | Stop reason: ALTCHOICE

## 2019-10-14 RX ORDER — ZOLPIDEM TARTRATE 10 MG/1
10 TABLET ORAL NIGHTLY PRN
Qty: 30 TABLET | Refills: 0 | Status: SHIPPED | OUTPATIENT
Start: 2019-10-14 | End: 2020-09-24 | Stop reason: SDUPTHER

## 2019-10-14 RX ORDER — ZOLPIDEM TARTRATE 10 MG/1
10 TABLET ORAL NIGHTLY PRN
Qty: 30 TABLET | Refills: 0 | Status: SHIPPED | OUTPATIENT
Start: 2019-10-14 | End: 2019-10-14 | Stop reason: SDUPTHER

## 2019-10-14 RX ORDER — ALPRAZOLAM 0.25 MG/1
0.25 TABLET ORAL NIGHTLY PRN
Qty: 30 TABLET | Refills: 0 | Status: SHIPPED | OUTPATIENT
Start: 2019-10-14 | End: 2020-09-24 | Stop reason: SDUPTHER

## 2019-10-14 ASSESSMENT — PATIENT HEALTH QUESTIONNAIRE - PHQ9
SUM OF ALL RESPONSES TO PHQ9 QUESTIONS 1 & 2: 0
2. FEELING DOWN, DEPRESSED OR HOPELESS: 0
SUM OF ALL RESPONSES TO PHQ QUESTIONS 1-9: 0
SUM OF ALL RESPONSES TO PHQ QUESTIONS 1-9: 0
1. LITTLE INTEREST OR PLEASURE IN DOING THINGS: 0

## 2019-11-21 ENCOUNTER — HOSPITAL ENCOUNTER (OUTPATIENT)
Age: 49
Setting detail: OUTPATIENT SURGERY
Discharge: HOME | End: 2019-11-21
Attending: PLASTIC SURGERY | Admitting: PLASTIC SURGERY
Payer: COMMERCIAL

## 2019-11-21 VITALS
HEIGHT: 67 IN | RESPIRATION RATE: 14 BRPM | SYSTOLIC BLOOD PRESSURE: 124 MMHG | BODY MASS INDEX: 24.48 KG/M2 | HEART RATE: 58 BPM | WEIGHT: 156 LBS | TEMPERATURE: 97.3 F | DIASTOLIC BLOOD PRESSURE: 84 MMHG | OXYGEN SATURATION: 98 %

## 2019-11-21 DIAGNOSIS — G89.18 POST-OP PAIN: Primary | ICD-10-CM

## 2019-11-21 PROCEDURE — 88305 TISSUE EXAM BY PATHOLOGIST: CPT

## 2019-11-21 PROCEDURE — 3600000002 HC SURGERY LEVEL 2 BASE: Performed by: PLASTIC SURGERY

## 2019-11-21 PROCEDURE — 3600000012 HC SURGERY LEVEL 2 ADDTL 15MIN: Performed by: PLASTIC SURGERY

## 2019-11-21 PROCEDURE — 7100000010 HC PHASE II RECOVERY - FIRST 15 MIN: Performed by: PLASTIC SURGERY

## 2019-11-21 PROCEDURE — 2709999900 HC NON-CHARGEABLE SUPPLY: Performed by: PLASTIC SURGERY

## 2019-11-21 RX ORDER — CEPHALEXIN 500 MG/1
500 CAPSULE ORAL 3 TIMES DAILY
Qty: 15 CAPSULE | Refills: 0 | Status: SHIPPED | OUTPATIENT
Start: 2019-11-21 | End: 2019-11-26

## 2019-11-21 RX ORDER — HYDROCODONE BITARTRATE AND ACETAMINOPHEN 5; 325 MG/1; MG/1
1 TABLET ORAL EVERY 6 HOURS PRN
Qty: 12 TABLET | Refills: 0 | Status: SHIPPED | OUTPATIENT
Start: 2019-11-21 | End: 2019-11-24

## 2019-11-21 ASSESSMENT — PAIN SCALES - GENERAL: PAINLEVEL_OUTOF10: 0

## 2019-11-21 ASSESSMENT — PAIN - FUNCTIONAL ASSESSMENT: PAIN_FUNCTIONAL_ASSESSMENT: 0-10

## 2019-11-22 LAB — DERMATOLOGY PATHOLOGY REPORT: NORMAL

## 2019-12-12 ENCOUNTER — HOSPITAL ENCOUNTER (OUTPATIENT)
Dept: MAMMOGRAPHY | Age: 49
Discharge: HOME | End: 2019-12-14
Payer: COMMERCIAL

## 2019-12-12 DIAGNOSIS — Z12.39 SCREENING FOR BREAST CANCER: ICD-10-CM

## 2019-12-12 DIAGNOSIS — Z01.419 WELL FEMALE EXAM WITH ROUTINE GYNECOLOGICAL EXAM: ICD-10-CM

## 2019-12-12 PROCEDURE — 77063 BREAST TOMOSYNTHESIS BI: CPT

## 2019-12-24 ENCOUNTER — OFFICE VISIT (OUTPATIENT)
Dept: PRIMARY CARE CLINIC | Age: 49
End: 2019-12-24
Payer: COMMERCIAL

## 2019-12-24 VITALS
DIASTOLIC BLOOD PRESSURE: 72 MMHG | TEMPERATURE: 97.9 F | HEART RATE: 55 BPM | BODY MASS INDEX: 24.75 KG/M2 | SYSTOLIC BLOOD PRESSURE: 134 MMHG | OXYGEN SATURATION: 97 % | WEIGHT: 158 LBS

## 2019-12-24 DIAGNOSIS — H66.002 NON-RECURRENT ACUTE SUPPURATIVE OTITIS MEDIA OF LEFT EAR WITHOUT SPONTANEOUS RUPTURE OF TYMPANIC MEMBRANE: Primary | ICD-10-CM

## 2019-12-24 DIAGNOSIS — Z91.89 AT RISK FOR SIDE EFFECT OF MEDICATION: ICD-10-CM

## 2019-12-24 PROCEDURE — 99202 OFFICE O/P NEW SF 15 MIN: CPT | Performed by: NURSE PRACTITIONER

## 2019-12-24 RX ORDER — AMOXICILLIN 875 MG/1
875 TABLET, COATED ORAL 2 TIMES DAILY
Qty: 14 TABLET | Refills: 0 | Status: SHIPPED | OUTPATIENT
Start: 2019-12-24 | End: 2019-12-31

## 2019-12-24 RX ORDER — FLUCONAZOLE 150 MG/1
150 TABLET ORAL ONCE
Qty: 1 TABLET | Refills: 0 | Status: SHIPPED | OUTPATIENT
Start: 2019-12-24 | End: 2019-12-24

## 2019-12-24 ASSESSMENT — ENCOUNTER SYMPTOMS
EYE DISCHARGE: 0
COUGH: 0
EYE ITCHING: 0
SHORTNESS OF BREATH: 0
SORE THROAT: 0
SINUS PAIN: 0

## 2020-04-18 ENCOUNTER — HOSPITAL ENCOUNTER (OUTPATIENT)
Age: 50
Discharge: HOME | End: 2020-04-18
Payer: COMMERCIAL

## 2020-04-19 ENCOUNTER — HOSPITAL ENCOUNTER (OUTPATIENT)
Age: 50
Setting detail: SPECIMEN
Discharge: HOME | End: 2020-04-19
Payer: COMMERCIAL

## 2020-04-19 LAB
SARS-COV-2, PCR: NORMAL
SARS-COV-2, PCR: NORMAL
SARS-COV-2: NORMAL
SARS-COV-2: NOT DETECTED
SOURCE: NORMAL
SOURCE: NORMAL

## 2020-04-20 ENCOUNTER — ANESTHESIA EVENT (OUTPATIENT)
Dept: OPERATING ROOM | Age: 50
End: 2020-04-20
Payer: COMMERCIAL

## 2020-04-21 ENCOUNTER — HOSPITAL ENCOUNTER (OUTPATIENT)
Age: 50
Setting detail: OUTPATIENT SURGERY
Discharge: HOME | End: 2020-04-21
Attending: PLASTIC SURGERY | Admitting: PLASTIC SURGERY
Payer: COMMERCIAL

## 2020-04-21 ENCOUNTER — ANESTHESIA (OUTPATIENT)
Dept: OPERATING ROOM | Age: 50
End: 2020-04-21
Payer: COMMERCIAL

## 2020-04-21 VITALS
HEART RATE: 52 BPM | WEIGHT: 143 LBS | OXYGEN SATURATION: 99 % | DIASTOLIC BLOOD PRESSURE: 65 MMHG | HEIGHT: 67 IN | BODY MASS INDEX: 22.44 KG/M2 | SYSTOLIC BLOOD PRESSURE: 120 MMHG | TEMPERATURE: 97.5 F | RESPIRATION RATE: 20 BRPM

## 2020-04-21 VITALS — DIASTOLIC BLOOD PRESSURE: 55 MMHG | OXYGEN SATURATION: 99 % | SYSTOLIC BLOOD PRESSURE: 94 MMHG

## 2020-04-21 PROCEDURE — 7100000010 HC PHASE II RECOVERY - FIRST 15 MIN: Performed by: PLASTIC SURGERY

## 2020-04-21 PROCEDURE — 88313 SPECIAL STAINS GROUP 2: CPT

## 2020-04-21 PROCEDURE — 2580000003 HC RX 258: Performed by: ANESTHESIOLOGY

## 2020-04-21 PROCEDURE — 88342 IMHCHEM/IMCYTCHM 1ST ANTB: CPT

## 2020-04-21 PROCEDURE — 6360000002 HC RX W HCPCS: Performed by: NURSE ANESTHETIST, CERTIFIED REGISTERED

## 2020-04-21 PROCEDURE — 88312 SPECIAL STAINS GROUP 1: CPT

## 2020-04-21 PROCEDURE — 3600000012 HC SURGERY LEVEL 2 ADDTL 15MIN: Performed by: PLASTIC SURGERY

## 2020-04-21 PROCEDURE — 6360000002 HC RX W HCPCS: Performed by: ANESTHESIOLOGY

## 2020-04-21 PROCEDURE — 2709999900 HC NON-CHARGEABLE SUPPLY: Performed by: PLASTIC SURGERY

## 2020-04-21 PROCEDURE — 3600000002 HC SURGERY LEVEL 2 BASE: Performed by: PLASTIC SURGERY

## 2020-04-21 PROCEDURE — 2500000003 HC RX 250 WO HCPCS: Performed by: PLASTIC SURGERY

## 2020-04-21 PROCEDURE — 3700000001 HC ADD 15 MINUTES (ANESTHESIA): Performed by: PLASTIC SURGERY

## 2020-04-21 PROCEDURE — 6370000000 HC RX 637 (ALT 250 FOR IP): Performed by: PLASTIC SURGERY

## 2020-04-21 PROCEDURE — 88305 TISSUE EXAM BY PATHOLOGIST: CPT

## 2020-04-21 PROCEDURE — 2580000003 HC RX 258: Performed by: PLASTIC SURGERY

## 2020-04-21 PROCEDURE — 7100000011 HC PHASE II RECOVERY - ADDTL 15 MIN: Performed by: PLASTIC SURGERY

## 2020-04-21 PROCEDURE — 3700000000 HC ANESTHESIA ATTENDED CARE: Performed by: PLASTIC SURGERY

## 2020-04-21 PROCEDURE — 2500000003 HC RX 250 WO HCPCS: Performed by: NURSE ANESTHETIST, CERTIFIED REGISTERED

## 2020-04-21 RX ORDER — MIDAZOLAM HYDROCHLORIDE 1 MG/ML
1 INJECTION INTRAMUSCULAR; INTRAVENOUS EVERY 10 MIN PRN
Status: DISCONTINUED | OUTPATIENT
Start: 2020-04-21 | End: 2020-04-21 | Stop reason: HOSPADM

## 2020-04-21 RX ORDER — ONDANSETRON 2 MG/ML
4 INJECTION INTRAMUSCULAR; INTRAVENOUS
Status: DISCONTINUED | OUTPATIENT
Start: 2020-04-21 | End: 2020-04-21 | Stop reason: HOSPADM

## 2020-04-21 RX ORDER — OXYCODONE HYDROCHLORIDE AND ACETAMINOPHEN 5; 325 MG/1; MG/1
1 TABLET ORAL EVERY 6 HOURS PRN
Qty: 20 TABLET | Refills: 0 | Status: SHIPPED | OUTPATIENT
Start: 2020-04-21 | End: 2020-04-28

## 2020-04-21 RX ORDER — FENTANYL CITRATE 50 UG/ML
50 INJECTION, SOLUTION INTRAMUSCULAR; INTRAVENOUS EVERY 5 MIN PRN
Status: DISCONTINUED | OUTPATIENT
Start: 2020-04-21 | End: 2020-04-21 | Stop reason: HOSPADM

## 2020-04-21 RX ORDER — MIDAZOLAM HYDROCHLORIDE 1 MG/ML
INJECTION INTRAMUSCULAR; INTRAVENOUS PRN
Status: DISCONTINUED | OUTPATIENT
Start: 2020-04-21 | End: 2020-04-21 | Stop reason: SDUPTHER

## 2020-04-21 RX ORDER — PROPOFOL 10 MG/ML
INJECTION, EMULSION INTRAVENOUS CONTINUOUS PRN
Status: DISCONTINUED | OUTPATIENT
Start: 2020-04-21 | End: 2020-04-21 | Stop reason: SDUPTHER

## 2020-04-21 RX ORDER — MEPERIDINE HYDROCHLORIDE 50 MG/ML
12.5 INJECTION INTRAMUSCULAR; INTRAVENOUS; SUBCUTANEOUS EVERY 5 MIN PRN
Status: DISCONTINUED | OUTPATIENT
Start: 2020-04-21 | End: 2020-04-21 | Stop reason: HOSPADM

## 2020-04-21 RX ORDER — CEFAZOLIN SODIUM 1 G/3ML
INJECTION, POWDER, FOR SOLUTION INTRAMUSCULAR; INTRAVENOUS PRN
Status: DISCONTINUED | OUTPATIENT
Start: 2020-04-21 | End: 2020-04-21 | Stop reason: SDUPTHER

## 2020-04-21 RX ORDER — MAGNESIUM HYDROXIDE 1200 MG/15ML
LIQUID ORAL CONTINUOUS PRN
Status: COMPLETED | OUTPATIENT
Start: 2020-04-21 | End: 2020-04-21

## 2020-04-21 RX ORDER — BUPIVACAINE HYDROCHLORIDE 5 MG/ML
INJECTION, SOLUTION EPIDURAL; INTRACAUDAL PRN
Status: DISCONTINUED | OUTPATIENT
Start: 2020-04-21 | End: 2020-04-21 | Stop reason: ALTCHOICE

## 2020-04-21 RX ORDER — SODIUM CHLORIDE, SODIUM LACTATE, POTASSIUM CHLORIDE, CALCIUM CHLORIDE 600; 310; 30; 20 MG/100ML; MG/100ML; MG/100ML; MG/100ML
INJECTION, SOLUTION INTRAVENOUS CONTINUOUS
Status: DISCONTINUED | OUTPATIENT
Start: 2020-04-21 | End: 2020-04-21

## 2020-04-21 RX ORDER — CEPHALEXIN 500 MG/1
500 CAPSULE ORAL 3 TIMES DAILY
Qty: 21 CAPSULE | Refills: 0 | Status: SHIPPED | OUTPATIENT
Start: 2020-04-21 | End: 2020-04-28

## 2020-04-21 RX ORDER — SODIUM CHLORIDE, SODIUM LACTATE, POTASSIUM CHLORIDE, CALCIUM CHLORIDE 600; 310; 30; 20 MG/100ML; MG/100ML; MG/100ML; MG/100ML
INJECTION, SOLUTION INTRAVENOUS CONTINUOUS
Status: DISCONTINUED | OUTPATIENT
Start: 2020-04-21 | End: 2020-04-21 | Stop reason: HOSPADM

## 2020-04-21 RX ORDER — GINSENG 100 MG
CAPSULE ORAL PRN
Status: DISCONTINUED | OUTPATIENT
Start: 2020-04-21 | End: 2020-04-21 | Stop reason: ALTCHOICE

## 2020-04-21 RX ORDER — LIDOCAINE HYDROCHLORIDE 10 MG/ML
INJECTION, SOLUTION EPIDURAL; INFILTRATION; INTRACAUDAL; PERINEURAL PRN
Status: DISCONTINUED | OUTPATIENT
Start: 2020-04-21 | End: 2020-04-21 | Stop reason: SDUPTHER

## 2020-04-21 RX ORDER — FENTANYL CITRATE 50 UG/ML
INJECTION, SOLUTION INTRAMUSCULAR; INTRAVENOUS PRN
Status: DISCONTINUED | OUTPATIENT
Start: 2020-04-21 | End: 2020-04-21 | Stop reason: SDUPTHER

## 2020-04-21 RX ORDER — FENTANYL CITRATE 50 UG/ML
25 INJECTION, SOLUTION INTRAMUSCULAR; INTRAVENOUS EVERY 5 MIN PRN
Status: DISCONTINUED | OUTPATIENT
Start: 2020-04-21 | End: 2020-04-21 | Stop reason: HOSPADM

## 2020-04-21 RX ADMIN — MIDAZOLAM HYDROCHLORIDE 1 MG: 1 INJECTION, SOLUTION INTRAMUSCULAR; INTRAVENOUS at 11:07

## 2020-04-21 RX ADMIN — LIDOCAINE HYDROCHLORIDE 25 MG: 10 INJECTION, SOLUTION EPIDURAL; INFILTRATION; INTRACAUDAL; PERINEURAL at 11:27

## 2020-04-21 RX ADMIN — SODIUM CHLORIDE, POTASSIUM CHLORIDE, SODIUM LACTATE AND CALCIUM CHLORIDE: 600; 310; 30; 20 INJECTION, SOLUTION INTRAVENOUS at 10:35

## 2020-04-21 RX ADMIN — FENTANYL CITRATE 50 MCG: 50 INJECTION INTRAMUSCULAR; INTRAVENOUS at 11:30

## 2020-04-21 RX ADMIN — PROPOFOL 75 MCG/KG/MIN: 10 INJECTION, EMULSION INTRAVENOUS at 11:29

## 2020-04-21 RX ADMIN — MIDAZOLAM HYDROCHLORIDE 2 MG: 1 INJECTION, SOLUTION INTRAMUSCULAR; INTRAVENOUS at 11:24

## 2020-04-21 RX ADMIN — CEFAZOLIN 2000 MG: 1 INJECTION, POWDER, FOR SOLUTION INTRAMUSCULAR; INTRAVENOUS at 11:35

## 2020-04-21 ASSESSMENT — PULMONARY FUNCTION TESTS
PIF_VALUE: 0
PIF_VALUE: 0
PIF_VALUE: 1
PIF_VALUE: 0
PIF_VALUE: 1
PIF_VALUE: 2
PIF_VALUE: 1
PIF_VALUE: 0
PIF_VALUE: 1
PIF_VALUE: 0
PIF_VALUE: 1
PIF_VALUE: 0
PIF_VALUE: 1
PIF_VALUE: 0
PIF_VALUE: 1
PIF_VALUE: 0
PIF_VALUE: 1
PIF_VALUE: 0
PIF_VALUE: 1
PIF_VALUE: 0

## 2020-04-21 ASSESSMENT — ENCOUNTER SYMPTOMS
SHORTNESS OF BREATH: 0
STRIDOR: 0

## 2020-04-21 ASSESSMENT — PAIN SCALES - GENERAL
PAINLEVEL_OUTOF10: 0

## 2020-04-21 ASSESSMENT — PAIN - FUNCTIONAL ASSESSMENT: PAIN_FUNCTIONAL_ASSESSMENT: 0-10

## 2020-04-21 NOTE — H&P
NP  Family History  Family Status   Relation Name Status    PGF      Mother  Alive    Father      Sister  Alive    MGM      MGF      PGM       family history includes Cervical Cancer in her mother; Diabetes in her paternal grandmother; Heart Attack in her father; Heart Disease in her father; High Blood Pressure in her mother; Osteoporosis in her mother; Other in her mother. OBJECTIVE:   VITALS:  height is 5' 7\" (1.702 m) and weight is 143 lb (64.9 kg). Her temporal temperature is 97.5 °F (36.4 °C). Her blood pressure is 128/94 (abnormal) and her pulse is 58. Her respiration is 20 and oxygen saturation is 99%. CONSTITUTIONAL:Alert and orientated to person, place and time. No acute distress. Friendly. SKIN:  Warm & dry, no rashes on exposed skin, irregular shaped pigmented lesion on  Left great toe nailbed. HEENT: HEAD: Normocephalic, atraumatic        EYES:  PERRL, EOMs intact, conjunctiva clear      EARS:  Equal bilaterally, no edema or thickening, skin is intact without lumps or lesions. No discharge. NOSE:  Nares patent, septum midline, no rhinorrhea      MOUTH/THROAT:  Mucous membranes moist, tongue is pink, uvula midline, teeth appear to be intact  NECK:  Supple, no lymphadenopathy, full ROM  LUNGS: Respirations even and non-labored. Clear to auscultation bilaterally, no wheezes/rales/rhonchi   CARDIOVASCULAR: regular rate and rhythm, no murmurs/rubs/gallops   ABDOMEN: soft, non-tender, non-distended, bowel sounds active x 4   MUSCULOSKELETAL: Full ROM bilateral upper extremities, Full ROM bilateral lower extremities. Strength of 5/5 bilateral upper extremities. Strength 5/5 bilateral lower extremities. VASCULAR:  Brisk cap refill bilateral fingers. Radial pulses are intact, 2+ bilaterally. Dorsalis pedis pulse 2+ bilaterally. No edema or varicosities bilateral lower extremities  NEUROLOGIC: CN II-XII are grossly intact. Gait not assessed.

## 2020-04-21 NOTE — ANESTHESIA PRE PROCEDURE
Department of Anesthesiology  Preprocedure Note       Name:  Bravo Velasco   Age:  47 y.o.  :  1970                                          MRN:  [id number]         Date:  2020      Surgeon: Matthias Cuenca): Sasha Solo MD    Procedure: Procedure(s):  EXCISION PIGMENTED SKIN LESION BIG TOE (LEFT)     Medications prior to admission:   Prior to Admission medications    Medication Sig Start Date End Date Taking? Authorizing Provider   estradiol (ESTRACE) 1 MG tablet TAKE 1 TABLET BY MOUTH ONCE DAILY.   Patient taking differently: Take 1 mg by mouth daily  19  Yes Toya Yu, DO   Multiple Vitamins-Minerals (THERAPEUTIC MULTIVITAMIN-MINERALS) tablet Take 1 tablet by mouth daily   Yes Historical Provider, MD       Current medications:    Current Facility-Administered Medications   Medication Dose Route Frequency Provider Last Rate Last Dose    lactated ringers infusion   Intravenous Continuous Corine Ocampo  mL/hr at 20 1035         Allergies:  No Known Allergies    Problem List:    Patient Active Problem List   Diagnosis Code    Menopausal symptoms N95.1    Melanoma in situ ([facility] [address] D03.9    Neoplasm of uncertain behavior of skin D48.5    Dysplastic nevus D23.9       Past Medical History:        Diagnosis Date    History of gastroesophageal reflux (GERD)     very mild and intermittent    History of stress test     WNL    Kidney stones  7 2016    X 2 PASSED ON OWN    LA (lupus anticoagulant) disorder ([address]     Pt also has Anti-RO (SSA-SSB)    Melanoma ([address] 2018    RT LOWER BACK    Melanoma ([facility] [address]     Rheumatoid arthritis(714.0)     HAS ANTIBDIES BUT NO SYMPTOMS    Skin lesion of foot     UNDER NAIL LEFT GREAT TOE    Wears glasses     READING       Past Surgical History:        Procedure Laterality Date    ABDOMEN SURGERY  ,     C SECTION    ABDOMEN SURGERY  2007    JOSHUA WITH RSO    ABDOMEN SURGERY  2008    LAPAROTOMY WITH EVIE  AND LSO [address], 2001    HYSTERECTOMY  7/2007    Dr. Kanchan Elkins -JOSHUA + RSO    LAPAROTOMY  7/25/08    Álvarez-EVIE and LSO     PLEURAL CATHETER INSERTION Right 11/21/2019    LEG LESION BIOPSY EXCISION - THIGH RIGHT/FOOT LESION BIOPSY EXCISION - SECOND TOE LEFT performed by Bhavna Sharma MD at [facility] BACK/FLANK SUBQ <3CM N/A 6/10/2019    Excision and biopsy upper abdomen, back and right medial thigh. LEFT MEDIAL THIGH performed by Bhavna Sharma MD at [address] OFFICE/OUTPT VISIT,PROCEDURE ONLY N/A 10/9/2018    EXCISION SKIN LESION MID LOWER BACK, RIGHT POST.  AURICULAR performed by Bhavna Sharma MD at [address] [facility] / [address]  06/10/2019    upper abdomen, upper back, Rt medial thigh, Left medial knee    SKIN CANCER EXCISION N/A 10/09/2018    mid lower back and right post auricular       Social History:    Social History     Tobacco Use    Smoking status: Never Smoker    Smokeless tobacco: Never Used   Substance Use Topics    Alcohol use: Yes     Comment: WINE 3 GLASSES A MONTH                                Counseling given: Not Answered      Vital Signs (Current):   Vitals:    04/20/20 0929 04/21/20 1014 04/21/20 1025   BP:   (!) 128/94   Pulse:   58   Resp:   20   Temp:   97.5 °F (36.4 °C)   TempSrc:   Temporal   SpO2:   99%   Weight: 141 lb (64 kg) 143 lb (64.9 kg)    Height: 5' 7\" (1.702 m) 5' 7\" (1.702 m)                                               BP Readings from Last 3 Encounters:   04/21/20 (!) 128/94   04/06/20 116/77   12/24/19 134/72       NPO Status: Time of last liquid consumption: 1700                        Time of last solid consumption: 2100                        Date of last liquid consumption: 04/20/20                        Date of last solid food consumption: 04/20/20    BMI:   Wt Readings from Last 3 Encounters:   04/21/20 143 lb (64.9 kg)   04/06/20 143 lb (64.9 kg) 12/24/19 158 lb (71.7 kg)     Body mass index is 22.4 kg/m². CBC:   Lab Results   Component Value Date    WBC 5.3 07/02/2019    RBC 4.65 07/02/2019    HGB 13.8 07/02/2019    HCT 42.6 07/02/2019    MCV 91.6 07/02/2019    RDW 11.9 07/02/2019     07/02/2019       CMP:   Lab Results   Component Value Date     07/02/2019    K 4.3 07/02/2019     07/02/2019    CO2 24 07/02/2019    BUN 12 07/02/2019    CREATININE 0.65 07/02/2019    GFRAA >60 07/02/2019    LABGLOM >60 07/02/2019    GLUCOSE 90 07/02/2019    PROT 7.0 07/02/2019    CALCIUM 8.7 07/02/2019    BILITOT 0.32 07/02/2019    ALKPHOS 50 07/02/2019    AST 18 07/02/2019    ALT 13 07/02/2019       POC Tests: No results for input(s): POCGLU, POCNA, POCK, POCCL, POCBUN, POCHEMO, POCHCT in the last 72 hours. Coags: No results found for: PROTIME, INR, APTT    HCG (If Applicable): No results found for: PREGTESTUR, PREGSERUM, HCG, HCGQUANT     ABGs: No results found for: PHART, PO2ART, IZI0HWE, HQA6VAF, BEART, R3TJHOUT     Type & Screen (If Applicable):  No results found for: LABABO, LABRH    Anesthesia Evaluation   no history of anesthetic complications:   Airway: Mallampati: II     Neck ROM: limited  Mouth opening: > = 3 FB Dental:          Pulmonary:       (-) COPD, asthma, shortness of breath, recent URI, sleep apnea and stridor                           Cardiovascular:        (-) pacemaker, past MI, CAD, CABG/stent, dysrhythmias,  angina and  CHF        Rate: normal                    Neuro/Psych:      (-) seizures, TIA and CVA           GI/Hepatic/Renal:        (-) GERD, liver disease and no renal disease       Endo/Other:    (+) : arthritis: rheumatoid. , .    (-) diabetes mellitus, hypothyroidism, hyperthyroidism               Abdominal:       Abdomen: soft. Vascular:                                          Anesthesia Plan      MAC     ASA 2       Induction: intravenous. MIPS: Postoperative opioids intended.   Anesthetic plan and risks

## 2020-04-23 LAB — DERMATOLOGY PATHOLOGY REPORT: NORMAL

## 2020-09-17 NOTE — TELEPHONE ENCOUNTER
Campbell Rodriguez is calling to request a refill on the following medication(s):    Last Visit Date (If Applicable):  46/04/7830    Next Visit Date:    Visit date not found    Medication Request:  Requested Prescriptions     Pending Prescriptions Disp Refills    ALPRAZolam (XANAX) 0.25 MG tablet 30 tablet 0     Sig: Take 1 tablet by mouth nightly as needed for Sleep for up to 30 days.  zolpidem (AMBIEN) 10 MG tablet 30 tablet 0     Sig: Take 1 tablet by mouth nightly as needed for Sleep for up to 30 days.

## 2020-09-18 RX ORDER — ZOLPIDEM TARTRATE 10 MG/1
10 TABLET ORAL NIGHTLY PRN
Qty: 30 TABLET | Refills: 0 | OUTPATIENT
Start: 2020-09-18 | End: 2020-10-18

## 2020-09-18 RX ORDER — ALPRAZOLAM 0.25 MG/1
0.25 TABLET ORAL NIGHTLY PRN
Qty: 30 TABLET | Refills: 0 | OUTPATIENT
Start: 2020-09-18 | End: 2020-10-18

## 2020-09-24 ENCOUNTER — TELEMEDICINE (OUTPATIENT)
Dept: FAMILY MEDICINE CLINIC | Age: 50
End: 2020-09-24
Payer: COMMERCIAL

## 2020-09-24 PROCEDURE — 99213 OFFICE O/P EST LOW 20 MIN: CPT | Performed by: FAMILY MEDICINE

## 2020-09-24 RX ORDER — FLUOXETINE HYDROCHLORIDE 20 MG/1
20 CAPSULE ORAL DAILY
Qty: 90 CAPSULE | Refills: 3 | Status: SHIPPED | OUTPATIENT
Start: 2020-09-24 | End: 2020-09-28 | Stop reason: ALTCHOICE

## 2020-09-24 RX ORDER — ZOLPIDEM TARTRATE 10 MG/1
10 TABLET ORAL NIGHTLY PRN
Qty: 30 TABLET | Refills: 0 | Status: SHIPPED | OUTPATIENT
Start: 2020-09-24 | End: 2021-10-14 | Stop reason: SDUPTHER

## 2020-09-24 RX ORDER — ALPRAZOLAM 0.25 MG/1
0.25 TABLET ORAL NIGHTLY PRN
Qty: 30 TABLET | Refills: 0 | Status: SHIPPED | OUTPATIENT
Start: 2020-09-24 | End: 2021-10-14 | Stop reason: SDUPTHER

## 2020-09-24 ASSESSMENT — PATIENT HEALTH QUESTIONNAIRE - PHQ9
SUM OF ALL RESPONSES TO PHQ9 QUESTIONS 1 & 2: 0
2. FEELING DOWN, DEPRESSED OR HOPELESS: 0
1. LITTLE INTEREST OR PLEASURE IN DOING THINGS: 0
SUM OF ALL RESPONSES TO PHQ QUESTIONS 1-9: 0
SUM OF ALL RESPONSES TO PHQ QUESTIONS 1-9: 0

## 2020-09-24 NOTE — PROGRESS NOTES
2020    TELEHEALTH EVALUATION -- Audio/Visual (During JDFFY-27 public health emergency)    HPI:    Dwaine Rudolph (:  1970) has requested an audio/video evaluation for the following concern(s):    She is being seen today in virtual visit evaluation for follow-up on anxiety insomnia she wanted a refill of Ambien and Xanax she has only gone through 27 of them in the last year but she does state her anxiety is much worse and she actually probably feels depressed at this point she has had a lot of changes that are not so great recently they have had to downsize their house secondary to financial reasons and just other stressors she was on Prozac years ago and it did help her and she would like to try that again    Review of Systems    Prior to Visit Medications    Medication Sig Taking? Authorizing Provider   ALPRAZolam [facility]) 0.25 MG tablet Take 1 tablet by mouth nightly as needed for Sleep for up to 30 days. Yes Toya Yu, DO   zolpidem (AMBIEN) 10 MG tablet Take 1 tablet by mouth nightly as needed for Sleep for up to 30 days. Yes Toya Yu DO   FLUoxetine (PROZAC) 20 MG capsule Take 1 capsule by mouth daily Yes Toya Yu DO   estradiol (ESTRACE) 1 MG tablet TAKE 1 TABLET BY MOUTH ONCE DAILY.   Patient taking differently: Take 1 mg by mouth daily   Toya Yu DO   Multiple Vitamins-Minerals (THERAPEUTIC MULTIVITAMIN-MINERALS) tablet Take 1 tablet by mouth daily  Historical Provider, MD       Social History     Tobacco Use    Smoking status: Never Smoker    Smokeless tobacco: Never Used   Substance Use Topics    Alcohol use: Yes     Comment: WINE 3 GLASSES A MONTH    Drug use: No        No Known Allergies,   Past Medical History:   Diagnosis Date    History of gastroesophageal reflux (GERD)     very mild and intermittent    History of stress test 2014    WNL    Kidney stones  7 2016    X 2 PASSED ON OWN    LA (lupus anticoagulant) disorder ([facility] [address] 1998    Pt also has Anti-RO (SSA-SSB)    Melanoma ([facility] [address] 2018    RT LOWER BACK    Melanoma ([facility] [address]     Rheumatoid arthritis(714.0) 1996    HAS ANTIBDIES BUT NO SYMPTOMS    Skin lesion of foot     UNDER NAIL LEFT GREAT TOE    Wears glasses     READING   ,   Past Surgical History:   Procedure Laterality Date    ABDOMEN SURGERY  ,     C SECTION    ABDOMEN SURGERY  2007    JOSHUA WITH RSO    ABDOMEN SURGERY  2008    LAPAROTOMY WITH EVIE  AND LSO     SECTION  ,     FOOT SURGERY Left 2020    BIOPSY LEFT BIG TOE performed by Luisa Hernandez MD at [address] [place] [address]  2007    Dr. Tj iDallo   08    Álvarez-EVIE and LSO     LEG BIOPSY EXCISION Right 2019    LEG LESION BIOPSY EXCISION - THIGH RIGHT/FOOT LESION BIOPSY EXCISION - SECOND TOE LEFT performed by Luisa Hernandez MD at [facility] Left 2020    Biopsy Left Big Toe    AK EXCISION TUMOR SOFT TISSUE BACK/FLANK SUBQ <3CM N/A 6/10/2019    Excision and biopsy upper abdomen, back and right medial thigh. LEFT MEDIAL THIGH performed by Luisa Hernandez MD at [address] OFFICE/OUTPT VISIT,PROCEDURE ONLY N/A 10/9/2018    EXCISION SKIN LESION MID LOWER BACK, RIGHT POST.  AURICULAR performed by Luisa Hernandez MD at [address] [place] [address] PRE-MAL[facility] / [address] [place] [address]  06/10/2019    upper abdomen, upper back, Rt medial thigh, Left medial knee    SKIN CANCER EXCISION N/A 10/09/2018    mid lower back and right post auricular   ,   Social History     Tobacco Use    Smoking status: Never Smoker    Smokeless tobacco: Never Used   Substance Use Topics    Alcohol use: Yes     Comment: WINE 3 GLASSES A MONTH    Drug use: No   ,   Family History   Problem Relation Age of Onset    Osteoporosis Mother     Cervical Cancer Mother     High Blood Pressure Mother     Other Mother         SMALL AORTIC ANUERYSM    Heart Disease Father     Heart Attack Father CAUSED HIS DEATH    Diabetes Paternal Grandmother        PHYSICAL EXAMINATION:  [ INSTRUCTIONS:  \"[x]\" Indicates a positive item  \"[]\" Indicates a negative item  -- DELETE ALL ITEMS NOT EXAMINED]  Vital Signs: (As obtained by patient/caregiver or practitioner observation)    Blood pressure-  Heart rate-    Respiratory rate-    Temperature-  Pulse oximetry-     Constitutional: [x] Appears well-developed and well-nourished [x] No apparent distress      [] Abnormal-   Mental status  [x] Alert and awake  [x] Oriented to person/place/time [x]Able to follow commands      Eyes:  EOM    [x]  Normal  [] Abnormal-  Sclera  [x]  Normal  [] Abnormal -         Discharge [x]  None visible  [] Abnormal -    HENT:   [x] Normocephalic, atraumatic. [] Abnormal   [x] Mouth/Throat: Mucous membranes are moist.     External Ears [x] Normal  [] Abnormal-     Neck: [x] No visualized mass     Pulmonary/Chest: [x] Respiratory effort normal.  [x] No visualized signs of difficulty breathing or respiratory distress        [] Abnormal-      Musculoskeletal:   [x] Normal gait with no signs of ataxia         [x] Normal range of motion of neck        [] Abnormal-       Neurological:        [x] No Facial Asymmetry (Cranial nerve 7 motor function) (limited exam to video visit)          [x] No gaze palsy        [] Abnormal-         Skin:        [x] No significant exanthematous lesions or discoloration noted on facial skin         [] Abnormal-            Psychiatric:       [x] Normal Affect [x] No Hallucinations        [] Abnormal-     Other pertinent observable physical exam findings-     ASSESSMENT/PLAN:  1. Anxiety    2. Primary insomnia      No orders of the defined types were placed in this encounter. Requested Prescriptions     Signed Prescriptions Disp Refills    ALPRAZolam (XANAX) 0.25 MG tablet 30 tablet 0     Sig: Take 1 tablet by mouth nightly as needed for Sleep for up to 30 days.     zolpidem (AMBIEN) 10 MG tablet 30 tablet 0 Sig: Take 1 tablet by mouth nightly as needed for Sleep for up to 30 days.  FLUoxetine (PROZAC) 20 MG capsule 90 capsule 3     Sig: Take 1 capsule by mouth daily         Return if symptoms worsen or fail to improve. Alberta Marquez is a 47 y.o. female being evaluated by a Virtual Visit (video visit) encounter to address concerns as mentioned above. A caregiver was present when appropriate. Due to this being a TeleHealth encounter (During NUUHF-56 public health emergency), evaluation of the following organ systems was limited: Vitals/Constitutional/EENT/Resp/CV/GI//MS/Neuro/Skin/Heme-Lymph-Imm. Pursuant to the emergency declaration under the [address] [place] [address], [address] [place] [address] authority and the [employer] Act, this Virtual Visit was conducted with patient's (and/or legal guardian's) consent, to reduce the patient's risk of exposure to COVID-19 and provide necessary medical care. The patient (and/or legal guardian) has also been advised to contact this office for worsening conditions or problems, and seek emergency medical treatment and/or call 911 if deemed necessary. Patient identification was verified at the start of the visit: Yes    Total time spent on this encounter: Not billed by time    Services were provided through a video synchronous discussion virtually to substitute for in-person clinic visit. Patient and provider were located at their individual homes. --Deanna Yost DO on 9/24/2020 at 10:38 AM    An electronic signature was used to authenticate this note.

## 2020-10-01 ENCOUNTER — HOSPITAL ENCOUNTER (OUTPATIENT)
Dept: PREADMISSION TESTING | Age: 50
Discharge: HOME | End: 2020-10-05
Payer: COMMERCIAL

## 2020-10-01 PROCEDURE — U0003 INFECTIOUS AGENT DETECTION BY NUCLEIC ACID (DNA OR RNA); SEVERE ACUTE RESPIRATORY SYNDROME CORONAVIRUS 2 (SARS-COV-2) (CORONAVIRUS DISEASE [COVID-19]), AMPLIFIED PROBE TECHNIQUE, MAKING USE OF HIGH THROUGHPUT TECHNOLOGIES AS DESCRIBED BY CMS-2020-01-R: HCPCS

## 2020-10-03 LAB — SARS-COV-2, NAA: NOT DETECTED

## 2020-10-06 ENCOUNTER — HOSPITAL ENCOUNTER (OUTPATIENT)
Age: 50
Setting detail: OUTPATIENT SURGERY
Discharge: HOME | End: 2020-10-06
Attending: PLASTIC SURGERY | Admitting: PLASTIC SURGERY
Payer: COMMERCIAL

## 2020-10-06 VITALS
RESPIRATION RATE: 18 BRPM | DIASTOLIC BLOOD PRESSURE: 61 MMHG | OXYGEN SATURATION: 100 % | WEIGHT: 140 LBS | HEART RATE: 50 BPM | SYSTOLIC BLOOD PRESSURE: 115 MMHG | HEIGHT: 67 IN | TEMPERATURE: 99 F | BODY MASS INDEX: 21.97 KG/M2

## 2020-10-06 PROCEDURE — 3600000002 HC SURGERY LEVEL 2 BASE: Performed by: PLASTIC SURGERY

## 2020-10-06 PROCEDURE — 2500000003 HC RX 250 WO HCPCS: Performed by: PLASTIC SURGERY

## 2020-10-06 PROCEDURE — 7100000010 HC PHASE II RECOVERY - FIRST 15 MIN: Performed by: PLASTIC SURGERY

## 2020-10-06 PROCEDURE — 88305 TISSUE EXAM BY PATHOLOGIST: CPT

## 2020-10-06 PROCEDURE — 3600000012 HC SURGERY LEVEL 2 ADDTL 15MIN: Performed by: PLASTIC SURGERY

## 2020-10-06 PROCEDURE — 2709999900 HC NON-CHARGEABLE SUPPLY: Performed by: PLASTIC SURGERY

## 2020-10-06 RX ORDER — BUPIVACAINE HYDROCHLORIDE AND EPINEPHRINE 2.5; 5 MG/ML; UG/ML
INJECTION, SOLUTION EPIDURAL; INFILTRATION; INTRACAUDAL; PERINEURAL PRN
Status: DISCONTINUED | OUTPATIENT
Start: 2020-10-06 | End: 2020-10-06 | Stop reason: ALTCHOICE

## 2020-10-06 RX ORDER — BUPIVACAINE HYDROCHLORIDE AND EPINEPHRINE 2.5; 5 MG/ML; UG/ML
INJECTION, SOLUTION EPIDURAL; INFILTRATION; INTRACAUDAL; PERINEURAL
Status: DISCONTINUED
Start: 2020-10-06 | End: 2020-10-06 | Stop reason: HOSPADM

## 2020-10-06 ASSESSMENT — PAIN SCALES - GENERAL: PAINLEVEL_OUTOF10: 0

## 2020-10-06 NOTE — OP NOTE
Operative Note        Patient: Leigha Nolen  YOB: 1970  MRN: [id number]    Date of Procedure: 10/6/2020    Pre-Op Diagnosis: BACK LESION IRREGULAR PIGMENT X 4    Post-Op Diagnosis: Same       Procedure(s):  BACK LESION BIOPSY EXCISION - IRREGULAR PIGMENTED LESION X 4  1. Excisional biopsy of irregular shaped pigmented lesion left back superior, excised diameter 0.8 cm closed with a 1.1 cm multilayer closure  2. Excisional biopsy of irregular shaped pigmented lesion left back inferior, excised diameter 1.0 cm closed with a 1.2 cm multilayer closure  3. Excisional biopsy of irregular shaped pigmented lesion right back superior, excised diameter 1.1 cm closed with 1.3 cm multilayer closure  4. Excisional biopsy of irregular shaped pigmented lesion right back inferior, excised diameter 1.3 cm closed with a 1.5 cm multilayer closure  Surgeon(s): Yoly Griffin MD    Assistant:  * No surgical staff found *    Anesthesia: Local, 0.25% Marcaine with epinephrine    Estimated Blood Loss (mL): Minimal    Complications: None    Specimens:   ID Type Source Tests Collected by Time Destination   A : LEFT BACK SUPERIOR LESION Tissue Tissue SURGICAL PATHOLOGY NED Riggs MD 10/6/2020 1317    B : LEFT BACK INFERIOR LESION  Tissue Tissue SURGICAL PATHOLOGY NED Riggs MD 10/6/2020 1318    C : RIGHT BACK INFERIOR LESION Tissue Tissue SURGICAL PATHOLOGY NED Riggs MD 10/6/2020 1322    D : RIGHT BACK SUPERIOR LESION Tissue Tissue SURGICAL PATHOLOGY NED Riggs MD 10/6/2020 1327        Implants:  * No implants in log *      Drains: * No LDAs found *    Findings: Multiple irregular shaped pigmented lesions on the back suspicious for dysplasia or malignancy. INDICATIONS:  Leigha Nolen is a 47 y.o. female who presents with multiple irregular shaped lesions on the back that have changed and have varied pigmentation.   The patient now presents for lesion removal under Local anesthesia. The proposed procedure was discussed with the patient at great length. The risks of the procedure were also discussed, including but not limited to the risk of infection, bleeding, scar formation, keloid formation, recurrence, neurovascular injury, and need for reoperation. The patient understands all the above and wishes to proceed. PROCEDURE:  The patient was laid in the prone position and the tissue surrounding each lesion was prepped and draped in sterile fashion. Local anesthetic using 0.25% marcaine with epinephrine was injected into the tissue surrounding each of the lesions. The lesions were each excised with a #15 blade and submitted for specimen. Each of the incisions were closed in a multiple layered fashion using 4-0 monocry in the deep layer, 4-0 monocryl in the dermal layer and 4-0 monocryl as a running subcuticular closure. Steri strips and Dermabond were applied, and wound care instructions provided. The procedure was well tolerated without complications. Pt will call with any questions or problems prior to follow up.

## 2020-10-06 NOTE — H&P
Office Note     LUIZ Araya MD, FACS     Subjective:      Patient ID: Richard Cano is a 47 y.o. female.     HPI  Patient comes in today for a skin check following multiple previous dysplastic lesions.     Review of Systems     Past Medical History        Past Medical History:   Diagnosis Date    History of gastroesophageal reflux (GERD)       very mild and intermittent    History of stress test      WNL    Kidney stones  7 2016     X 2 PASSED ON OWN    LA (lupus anticoagulant) disorder ([facility] [address]      Pt also has Anti-RO (SSA-SSB)    Melanoma ([facility] [address] 2018     RT LOWER BACK    Melanoma ([facility] [address]      Rheumatoid arthritis(714.0)      HAS ANTIBDIES BUT NO SYMPTOMS    Skin lesion of foot       UNDER NAIL LEFT GREAT TOE    Wears glasses       READING        Past Surgical History         Past Surgical History:   Procedure Laterality Date    ABDOMEN SURGERY   ,      C SECTION    ABDOMEN SURGERY   2007     JOSHUA WITH RSO    ABDOMEN SURGERY   2008     LAPAROTOMY WITH EVIE  AND LSO     SECTION   ,     FOOT SURGERY Left 2020     BIOPSY LEFT BIG TOE performed by Maggie Anderson MD at [facility]   2007     Dr. Iraida Lowry -JOSHUA + [address] [place] [address]   08     Álvarez-EVIE and LSO     OTHER SURGICAL HISTORY Left 2020     Biopsy Left Big Toe    PLEURAL CATHETER INSERTION Right 2019     LEG LESION BIOPSY EXCISION - THIGH RIGHT/FOOT LESION BIOPSY EXCISION - SECOND TOE LEFT performed by Maggie Anderson MD at [facility] BACK/FLANK SUBQ <3CM N/A 6/10/2019     Excision and biopsy upper abdomen, back and right medial thigh. LEFT MEDIAL THIGH performed by Maggie Anderson MD at [address] OFFICE/OUTPT VISIT,PROCEDURE ONLY N/A 10/9/2018     EXCISION SKIN LESION MID LOWER BACK, RIGHT POST.  AURICULAR performed by Maggie Anderson MD at [facility] / [facility] SKIN LESION EXCISION   06/10/2019     upper abdomen, upper back, Rt medial thigh, Left medial knee    SKIN CANCER EXCISION N/A 10/09/2018     mid lower back and right post auricular        No Known Allergies  Current Facility-Administered Medications          Current Outpatient Medications   Medication Sig Dispense Refill    estradiol (ESTRACE) 1 MG tablet TAKE 1 TABLET BY MOUTH ONCE DAILY. (Patient taking differently: Take 1 mg by mouth daily ) 90 tablet 3    Multiple Vitamins-Minerals (THERAPEUTIC MULTIVITAMIN-MINERALS) tablet Take 1 tablet by mouth daily          No current facility-administered medications for this visit. Social History               Socioeconomic History    Marital status:        Spouse name: Not on file    Number of children: 2    Years of education: college     Highest education level: Not on file   Occupational History    Occupation:  NP    Social Needs    Financial resource strain: Not on file    Food insecurity       Worry: Not on file       Inability: Not on file    Transportation needs       Medical: Not on file       Non-medical: Not on file   Tobacco Use    Smoking status: Never Smoker    Smokeless tobacco: Never Used   Substance and Sexual Activity    Alcohol use:  Yes       Comment: WINE 3 GLASSES A MONTH    Drug use: No    Sexual activity: Not on file   Lifestyle    Physical activity       Days per week: Not on file       Minutes per session: Not on file    Stress: Not on file   Relationships    Social connections       Talks on phone: Not on file       Gets together: Not on file       Attends Synagogue service: Not on file       Active member of club or organization: Not on file       Attends meetings of clubs or organizations: Not on file       Relationship status: Not on file    Intimate partner violence       Fear of current or ex partner: Not on file       Emotionally abused: Not on file       Physically abused: Not on file       Forced sexual activity: Not on file   Other Topics Concern    Not on file   Social History Narrative    Not on file        Family History         Family History   Problem Relation Age of Onset    Osteoporosis Mother      Cervical Cancer Mother      High Blood Pressure Mother      Other Mother           SMALL AORTIC ANUERYSM    Heart Disease Father      Heart Attack Father           CAUSED HIS DEATH    Diabetes Paternal Grandmother          Review of systems is otherwise negative. /84   Pulse 69   Temp 98.2 °F (36.8 °C) (Skin)   Ht 5' 7\" (1.702 m)   Wt 143 lb (64.9 kg)   LMP 07/29/2007   BMI 22.40 kg/m²         Objective:   Physical Exam  Vitals signs and nursing note reviewed. Constitutional:       Appearance: Normal appearance. She is well-developed. She is not diaphoretic. HENT:      Head: Normocephalic and atraumatic. Nose: Nose normal.   Eyes:      Conjunctiva/sclera: Conjunctivae normal.      Pupils: Pupils are equal, round, and reactive to light. Neck:      Musculoskeletal: Normal range of motion. Vascular: No JVD. Trachea: No tracheal deviation. Cardiovascular:      Rate and Rhythm: Normal rate. Pulmonary:      Effort: Pulmonary effort is normal. No respiratory distress. Breath sounds: No wheezing. Abdominal:      General: There is no distension. Palpations: Abdomen is soft. Musculoskeletal: Normal range of motion. General: No tenderness. Lymphadenopathy:      Cervical: No cervical adenopathy. Skin:     General: Skin is warm and dry. Coloration: Skin is not pale. Findings: No erythema or rash. Nails: There is no clubbing. Comments: Multiple irregular shaped pigmented lesions on the back. There are 4 that are the most irregular with some varied pigmentation. These are located on the lower back. No other suspicious lesions noted on her extremities both upper and lower as well as her anterior trunk, neck and face. Neurological:      Mental Status: She is alert and oriented to person, place, and time. Cranial Nerves: No cranial nerve deficit. Psychiatric:         Speech: Speech normal.         Behavior: Behavior normal.         Thought Content: Thought content normal.         Judgment: Judgment normal.            Assessment:   Multiple irregular shaped pigmented lesions on the lower back. Plan: We will get her scheduled for excisional biopsy of 4 irregular shaped pigmented lesions given her history of previous dysplasia. Malignancy or dysplasia cannot be excluded with these 4 lesions. Patient understands the risks of the procedure and wished to proceed.                   The patient was evaluated and examined with my nurse in the room at all times. Portions of this note were transcribed using Dragon voice recognition technology and as such may reflect some variations in voice recognition.     COVID-19 precautions were taken throughout the entire office visit. Patient was screened for COVID-19 symptoms and temperature was taken prior to coming back to the exam room.    A mask as well as gloves was worn throughout the entire office visit and distancing maintained as much as possible in between the physical examination periods.     A Jennifer Cooper MD

## 2020-10-06 NOTE — BRIEF OP NOTE
Brief Postoperative Note      Patient: Carleen Osorio  YOB: 1970  MRN: [id number]    Date of Procedure: 10/6/2020    Pre-Op Diagnosis: BACK LESION IRREGULAR PIGMENT X 4    Post-Op Diagnosis: Same       Procedure(s):  BACK LESION BIOPSY EXCISION - IRREGULAR PIGMENTED LESION X 4  1. Excisional biopsy of irregular shaped pigmented lesion left back superior, excised diameter 0.8 cm closed with a 1.1 cm multilayer closure  2. Excisional biopsy of irregular shaped pigmented lesion left back inferior, excised diameter 1.0 cm closed with a 1.2 cm multilayer closure  3. Excisional biopsy of irregular shaped pigmented lesion right back superior, excised diameter 1.1 cm closed with 1.3 cm multilayer closure  4. Excisional biopsy of irregular shaped pigmented lesion right back inferior, excised diameter 1.3 cm closed with a 1.5 cm multilayer closure  Surgeon(s): Steve Thorne MD    Assistant:  * No surgical staff found *    Anesthesia: Local, 0.25% Marcaine with epinephrine    Estimated Blood Loss (mL): Minimal    Complications: None    Specimens:   ID Type Source Tests Collected by Time Destination   A : LEFT BACK SUPERIOR LESION Tissue Tissue SURGICAL PATHOLOGY NED Steele MD 10/6/2020 1317    B : LEFT BACK INFERIOR LESION  Tissue Tissue SURGICAL PATHOLOGY NED Steele MD 10/6/2020 1318    C : RIGHT BACK INFERIOR LESION Tissue Tissue SURGICAL PATHOLOGY NED Steele MD 10/6/2020 1322    D : RIGHT BACK SUPERIOR LESION Tissue Tissue SURGICAL PATHOLOGY NED Steele MD 10/6/2020 1327        Implants:  * No implants in log *      Drains: * No LDAs found *    Findings: Multiple irregular shaped pigmented lesions on the back suspicious for dysplasia or malignancy.     Electronically signed by Steve Thorne MD on 10/6/2020 at 1:31 PM

## 2020-10-08 LAB — DERMATOLOGY PATHOLOGY REPORT: NORMAL

## 2020-11-05 RX ORDER — ESTRADIOL 1 MG/1
TABLET ORAL
Qty: 90 TABLET | Refills: 3 | Status: SHIPPED | OUTPATIENT
Start: 2020-11-05 | End: 2021-10-14 | Stop reason: SDUPTHER

## 2020-11-05 NOTE — TELEPHONE ENCOUNTER
Richard Cano is calling to request a refill on the following medication(s):    Last Visit Date (If Applicable):  1/37/1980    Next Visit Date:    Visit date not found    Medication Request:  Requested Prescriptions     Pending Prescriptions Disp Refills    estradiol (ESTRACE) 1 MG tablet [Pharmacy Med Name: ESTRADIOL 1MG TABS] 90 tablet 3     Sig: TAKE 1 TABLET BY MOUTH ONE TIME A DAY

## 2021-06-17 ENCOUNTER — HOSPITAL ENCOUNTER (OUTPATIENT)
Dept: LAB | Age: 51
Setting detail: SPECIMEN
Discharge: HOME | End: 2021-06-17
Payer: COMMERCIAL

## 2021-06-17 DIAGNOSIS — Z20.822 COVID-19 RULED OUT BY LABORATORY TESTING: Primary | ICD-10-CM

## 2021-06-21 ENCOUNTER — HOSPITAL ENCOUNTER (OUTPATIENT)
Age: 51
Setting detail: OUTPATIENT SURGERY
Discharge: HOME | End: 2021-06-21
Attending: PLASTIC SURGERY | Admitting: PLASTIC SURGERY
Payer: COMMERCIAL

## 2021-06-21 VITALS
SYSTOLIC BLOOD PRESSURE: 135 MMHG | WEIGHT: 148.5 LBS | HEIGHT: 67 IN | BODY MASS INDEX: 23.31 KG/M2 | OXYGEN SATURATION: 97 % | DIASTOLIC BLOOD PRESSURE: 86 MMHG | HEART RATE: 57 BPM | TEMPERATURE: 97.7 F

## 2021-06-21 PROCEDURE — 2709999900 HC NON-CHARGEABLE SUPPLY: Performed by: PLASTIC SURGERY

## 2021-06-21 PROCEDURE — 3600000012 HC SURGERY LEVEL 2 ADDTL 15MIN: Performed by: PLASTIC SURGERY

## 2021-06-21 PROCEDURE — 7100000010 HC PHASE II RECOVERY - FIRST 15 MIN: Performed by: PLASTIC SURGERY

## 2021-06-21 PROCEDURE — 88305 TISSUE EXAM BY PATHOLOGIST: CPT

## 2021-06-21 PROCEDURE — 3600000002 HC SURGERY LEVEL 2 BASE: Performed by: PLASTIC SURGERY

## 2021-06-21 PROCEDURE — 2500000003 HC RX 250 WO HCPCS: Performed by: PLASTIC SURGERY

## 2021-06-21 RX ORDER — BUPIVACAINE HYDROCHLORIDE AND EPINEPHRINE 2.5; 5 MG/ML; UG/ML
INJECTION, SOLUTION EPIDURAL; INFILTRATION; INTRACAUDAL; PERINEURAL PRN
Status: DISCONTINUED | OUTPATIENT
Start: 2021-06-21 | End: 2021-06-21 | Stop reason: ALTCHOICE

## 2021-06-21 RX ORDER — BUPIVACAINE HYDROCHLORIDE AND EPINEPHRINE 2.5; 5 MG/ML; UG/ML
INJECTION, SOLUTION INFILTRATION; PERINEURAL
Status: DISCONTINUED
Start: 2021-06-21 | End: 2021-06-21 | Stop reason: HOSPADM

## 2021-06-21 ASSESSMENT — PAIN - FUNCTIONAL ASSESSMENT: PAIN_FUNCTIONAL_ASSESSMENT: 0-10

## 2021-06-21 ASSESSMENT — PAIN SCALES - GENERAL: PAINLEVEL_OUTOF10: 0

## 2021-06-21 NOTE — OP NOTE
Operative Note      Patient: Mendel Nottingham  YOB: 1970  MRN: [id number]    Date of Procedure: 6/21/2021    Pre-Op Diagnosis: TRUNK IRREGULAR PIGMENTED LESION X 4    Post-Op Diagnosis: Same       Procedure(s):  IRREGULAR PIGMENTED  LESION BIOPSY EXCISION X 4 BACK   1. Excision of irregular shaped pigmented lesion left back lateral, excised diameter 0.7 cm closed with 0.9 cm multilayer closure. 2.  Excision of irregular shaped pigmented lesion left back medial, excised diameter 0.8 cm closed with a 1.0 cm multilayer closure  3. Excision of irregular shaped pigmented lesion right back lateral, excised diameter 1.0 cm closed with a 1.2 cm multilayer closure  4. Excision of irregular shaped pigmented lesion right back medial, excised ammeter 0.7 cm closed with a 0.9 cm multilayer closure    Surgeon(s): Lina Jules MD    Assistant:   * No surgical staff found *    Anesthesia: Local    Estimated Blood Loss (mL): Minimal    Complications: None    Specimens:   ID Type Source Tests Collected by Time Destination   A : BACK LESION LEFT MEDIAL  Tissue Skin SURGICAL PATHOLOGY A Juan Miguel Islas MD 6/21/2021 5660    B : BACK LESION LEFT LATERAL  Tissue Skin SURGICAL PATHOLOGY A Juan Miguel Islas MD 6/21/2021 6573    C : BACK LESION RIGHT MEDIAL  Tissue Skin SURGICAL PATHOLOGY A Juan Miguel Islas MD 6/21/2021 5275    D : BACK LESION RIGHT LATERAL  Tissue Skin SURGICAL PATHOLOGY A Juan Miguel Islas MD 6/21/2021 4527        Implants:  * No implants in log *      Drains: * No LDAs found *    Findings: Multiple irregular shaped pigmented lesions    Detailed Description of Procedure:   INDICATIONS:  Mendel Nottingham is a 45 y.o. female who presents with multiple irregular shaped lesions on the back that have changed and have varied pigmentation. Patient has a history of melanoma in situ.   The patient now presents for lesion removal under Local .  The proposed procedure was discussed with the patient at great length. The risks of the procedure were also discussed, including but not limited to the risk of infection, bleeding, scar formation, keloid formation, recurrence, neurovascular injury, and need for reoperation. The patient understands all the above and wishes to proceed. PROCEDURE:  The patient was laid in the prone position and the tissue surrounding each lesion was prepped and draped in sterile fashion. Local anesthetic using 0.25% marcaine with epinephrine was injected into the tissue surrounding the lesions. The lesions were each excised with a #15 blade and submitted for specimen. The incisions were closed in a multiple layered fashion using 4-0 monocry in the deep layer and 4-0 monocryl in the dermal layer and 4-0 monocryl as a running subcuticular closure. Each lesion size excision and length of multilayer closure listed below:  1. Excision of irregular shaped pigmented lesion left back lateral, excised diameter 0.7 cm closed with 0.9 cm multilayer closure. 2.  Excision of irregular shaped pigmented lesion left back medial, excised diameter 0.8 cm closed with a 1.0 cm multilayer closure  3. Excision of irregular shaped pigmented lesion right back lateral, excised diameter 1.0 cm closed with a 1.2 cm multilayer closure  4. Excision of irregular shaped pigmented lesion right back medial, excised ammeter 0.7 cm closed with a 0.9 cm multilayer closure  Steri strips were applied, and wound care instructions provided. The procedure was well tolerated without complications. Pt will call with any questions or problems prior to follow up.         Electronically signed by Cameron Akers MD on 6/21/2021 at 10:05 AM

## 2021-06-21 NOTE — H&P
Office Note     LUIZ Moreira MD, FACS     Subjective:      Patient ID: Abner Gonzales is a 45 y.o. female.     HPI  Patient comes in today for a skin check. She has a previous history of melanoma in situ. She denies any changes to her lesions that she can see but she has multiple irregular shaped pigmented lesions on her trunk that she would like to have evaluated especially given her history. Review of Systems     Past Medical History        Past Medical History:   Diagnosis Date    History of gastroesophageal reflux (GERD)       very mild and intermittent    History of stress test      WNL    Kidney stones  7 2016     X 2 PASSED ON OWN    [facility] (lupus anticoagulant) disorder ([facility]      Pt also has Anti-RO (SSA-SSB)    Melanoma ([facility] 2018     RT LOWER BACK    Melanoma ([facility]      Rheumatoid arthritis(714.0)      HAS ANTIBDIES BUT NO SYMPTOMS    Skin lesion of foot       UNDER NAIL LEFT GREAT TOE    Wears glasses       READING         Past Surgical History         Past Surgical History:   Procedure Laterality Date    ABDOMEN SURGERY   ,      C SECTION    ABDOMEN SURGERY   2007     JOSHUA WITH RSO    ABDOMEN SURGERY   2008     LAPAROTOMY WITH EVIE  AND LSO     SECTION   ,     FOOT SURGERY Left 2020     BIOPSY LEFT BIG TOE performed by Jojo Kulkarni MD at [address] [place] [address] 2007     Dr. Linsey Parks -[facility] + [address]   08     Álvarez-EVIE and LSO     LEG BIOPSY EXCISION Right 2019     LEG LESION BIOPSY EXCISION - THIGH RIGHT/FOOT LESION BIOPSY EXCISION - SECOND TOE LEFT performed by Jojo Kulkarni MD at [facility] Left 2020     Biopsy Left Big Toe    MI EXCISION TUMOR SOFT TISSUE BACK/FLANK SUBQ <3CM N/A 6/10/2019     Excision and biopsy upper abdomen, back and right medial thigh. LEFT MEDIAL THIGH performed by Jojo Kulkarni MD at [address] OFFICE/OUTPT VISIT,PROCEDURE ONLY N/A 10/9/2018     EXCISION SKIN LESION MID LOWER BACK, RIGHT POST. AURICULAR performed by Aria Suarez MD at [address] PRE-[place] / [address]   06/10/2019     upper abdomen, upper back, Rt medial thigh, Left medial knee    PRE-MALIGNANT / BENIGN SKIN LESION EXCISION   10/06/2020     BACK LESION BIOPSY EXCISION - IRREGULAR PIGMENTED LESION X 4    SKIN BIOPSY N/A 10/6/2020     BACK LESION BIOPSY EXCISION - IRREGULAR PIGMENTED LESION X 4 performed by Aria Suarez MD at [address] N/A 10/09/2018     mid lower back and right post auricular         No Known Allergies  Current Facility-Administered Medications   No current outpatient medications on file.      No current facility-administered medications for this visit.         Social History               Socioeconomic History    Marital status:        Spouse name: Not on file    Number of children: 2    Years of education: college     Highest education level: Not on file   Occupational History    Occupation:  NP    Tobacco Use    Smoking status: Never Smoker    Smokeless tobacco: Never Used   Vaping Use    Vaping Use: Never used   Substance and Sexual Activity    Alcohol use: Yes       Comment: WINE 3 GLASSES A MONTH    Drug use: No    Sexual activity: Yes       Partners: Male   Other Topics Concern    Not on file   Social History Narrative    Not on file      Social Determinants of Health          Financial Resource Strain:     Difficulty of Paying Living Expenses:    Food Insecurity:     Worried About Running Out of Food in the Last Year:     [address] in the Last Year:    Transportation Needs:     Lack of Transportation (Medical):      Lack of Transportation (Non-Medical):    Physical Activity:     Days of Exercise per Week:     Minutes of Exercise per Session:    Stress:     Feeling of Stress :    Social Connections:  Frequency of Communication with Friends and Family:     Frequency of Social Gatherings with Friends and Family:     Attends Adventism Services:     Active Member of Clubs or Organizations:     Attends Club or Organization Meetings:     Marital Status:    Intimate Partner Violence:     Fear of Current or Ex-Partner:     Emotionally Abused:     Physically Abused:     Sexually Abused:          Family History         Family History   Problem Relation Age of Onset    Osteoporosis Mother      Cervical Cancer Mother      High Blood Pressure Mother      Other Mother           SMALL AORTIC ANUERYSM    Heart Disease Father      Heart Attack Father           CAUSED HIS DEATH    Diabetes Paternal Grandmother           Review of systems is otherwise negative. BP (!) 85/55   Pulse 53   Resp 18   Ht 5' 7\" (1.702 m) Comment: pt stated  Wt 142 lb (64.4 kg) Comment: pt stated  LMP 07/29/2007   BMI 22.24 kg/m²         Objective:   Physical Exam  Vitals and nursing note reviewed. Exam conducted with a chaperone present. Constitutional:       Appearance: Normal appearance. She is well-developed. She is not diaphoretic. HENT:      Head: Normocephalic and atraumatic. Nose: Nose normal.   Eyes:      Extraocular Movements: Extraocular movements intact. Conjunctiva/sclera: Conjunctivae normal.      Pupils: Pupils are equal, round, and reactive to light. Neck:      Vascular: No JVD. Trachea: No tracheal deviation. Cardiovascular:      Rate and Rhythm: Normal rate. Pulmonary:      Effort: Pulmonary effort is normal. No respiratory distress. Breath sounds: No wheezing. Abdominal:      General: There is no distension. Palpations: Abdomen is soft. Musculoskeletal:         General: No tenderness. Normal range of motion. Cervical back: Normal range of motion. Lymphadenopathy:      Cervical: No cervical adenopathy. Skin:     General: Skin is warm and dry.       Coloration:

## 2021-06-23 LAB — DERMATOLOGY PATHOLOGY REPORT: NORMAL

## 2021-10-14 ENCOUNTER — TELEMEDICINE (OUTPATIENT)
Dept: FAMILY MEDICINE CLINIC | Age: 51
End: 2021-10-14
Payer: COMMERCIAL

## 2021-10-14 DIAGNOSIS — E55.9 VITAMIN D DEFICIENCY: ICD-10-CM

## 2021-10-14 DIAGNOSIS — Z12.31 SCREENING MAMMOGRAM FOR BREAST CANCER: ICD-10-CM

## 2021-10-14 DIAGNOSIS — Z00.00 WELL ADULT EXAM: Primary | ICD-10-CM

## 2021-10-14 DIAGNOSIS — F41.9 ANXIETY: ICD-10-CM

## 2021-10-14 DIAGNOSIS — N95.1 VAGINAL DRYNESS, MENOPAUSAL: ICD-10-CM

## 2021-10-14 DIAGNOSIS — F51.01 PRIMARY INSOMNIA: ICD-10-CM

## 2021-10-14 PROCEDURE — 99396 PREV VISIT EST AGE 40-64: CPT | Performed by: FAMILY MEDICINE

## 2021-10-14 RX ORDER — FLUOXETINE HYDROCHLORIDE 20 MG/1
20 CAPSULE ORAL DAILY
Qty: 90 CAPSULE | Refills: 3 | Status: SHIPPED | OUTPATIENT
Start: 2021-10-14 | End: 2022-01-18

## 2021-10-14 RX ORDER — ESTRADIOL 1 MG/1
TABLET ORAL
Qty: 90 TABLET | Refills: 3 | Status: SHIPPED | OUTPATIENT
Start: 2021-10-14

## 2021-10-14 RX ORDER — ZOLPIDEM TARTRATE 10 MG/1
10 TABLET ORAL NIGHTLY PRN
Qty: 30 TABLET | Refills: 0 | Status: SHIPPED | OUTPATIENT
Start: 2021-10-14 | End: 2021-11-13

## 2021-10-14 RX ORDER — ALPRAZOLAM 0.25 MG/1
0.25 TABLET ORAL NIGHTLY PRN
Qty: 30 TABLET | Refills: 0 | Status: SHIPPED | OUTPATIENT
Start: 2021-10-14 | End: 2021-11-13

## 2021-10-14 ASSESSMENT — PATIENT HEALTH QUESTIONNAIRE - PHQ9
SUM OF ALL RESPONSES TO PHQ QUESTIONS 1-9: 0
SUM OF ALL RESPONSES TO PHQ QUESTIONS 1-9: 0
1. LITTLE INTEREST OR PLEASURE IN DOING THINGS: 0
SUM OF ALL RESPONSES TO PHQ QUESTIONS 1-9: 0
SUM OF ALL RESPONSES TO PHQ9 QUESTIONS 1 & 2: 0
2. FEELING DOWN, DEPRESSED OR HOPELESS: 0

## 2021-10-14 NOTE — PROGRESS NOTES
10/14/2021    TELEHEALTH EVALUATION -- Audio/Visual (During POSXH-58 public health emergency)    HPI:    Amy Obregon (:  1970) has requested an audio/video evaluation for the following concern(s):    Seen today for well visit she states overall she is doing fine she is really not having any new complaints or problems at all she does need some refills of her meds she has not done lab work in a while she does have a mammogram scheduled she is up-to-date on a colonoscopy. She does need to do a Pap smear her gynecologist left town    Review of Systems    Prior to Visit Medications    Medication Sig Taking? Authorizing Provider   estradiol (ESTRACE) 1 MG tablet TAKE 1 TABLET BY MOUTH ONE TIME A DAY Yes Toya Yu, DO   FLUoxetine (PROZAC) 20 MG capsule Take 1 capsule by mouth daily Yes Toya Yu DO   zolpidem (AMBIEN) 10 MG tablet Take 1 tablet by mouth nightly as needed for Sleep for up to 30 days. Yes Toya Yu, DO   ALPRAZolam (XANAX) 0.25 MG tablet Take 1 tablet by mouth nightly as needed for Sleep for up to 30 days.  Yes Toya Yu, DO   Multiple Vitamins-Minerals (THERAPEUTIC MULTIVITAMIN-MINERALS) tablet Take 1 tablet by mouth daily  Historical Provider, MD       Social History     Tobacco Use    Smoking status: Never Smoker    Smokeless tobacco: Never Used   Vaping Use    Vaping Use: Never used   Substance Use Topics    Alcohol use: Yes     Comment: WINE 3 GLASSES A MONTH    Drug use: No        No Known Allergies,   Past Medical History:   Diagnosis Date    History of gastroesophageal reflux (GERD)     very mild and intermittent    History of stress test     WNL    Kidney stones  7 2016    X 2 PASSED ON OWN    LA (lupus anticoagulant) disorder ([address] 1998    Pt also has Anti-RO (SSA-SSB)    Melanoma ([facility] 2018    RT LOWER BACK    Melanoma ([address]     Rheumatoid arthritis(714.0)     HAS ANTIBDIES BUT NO SYMPTOMS    Skin lesion of foot     UNDER NAIL LEFT GREAT tobacco: Never Used   Vaping Use    Vaping Use: Never used   Substance Use Topics    Alcohol use: Yes     Comment: WINE 3 GLASSES A MONTH    Drug use: No   ,   Family History   Problem Relation Age of Onset    Osteoporosis Mother     Cervical Cancer Mother     High Blood Pressure Mother     Other Mother         SMALL AORTIC ANUERYSM    Heart Disease Father     Heart Attack Father         CAUSED HIS DEATH    Diabetes Paternal Grandmother        PHYSICAL EXAMINATION:  [ INSTRUCTIONS:  \"[x]\" Indicates a positive item  \"[]\" Indicates a negative item  -- DELETE ALL ITEMS NOT EXAMINED]  Vital Signs: (As obtained by patient/caregiver or practitioner observation)    Blood pressure-  Heart rate-    Respiratory rate-    Temperature-  Pulse oximetry-     Constitutional: [x] Appears well-developed and well-nourished [x] No apparent distress      [] Abnormal-   Mental status  [x] Alert and awake  [x] Oriented to person/place/time [x]Able to follow commands      Eyes:  EOM    [x]  Normal  [] Abnormal-  Sclera  [x]  Normal  [] Abnormal -         Discharge [x]  None visible  [] Abnormal -    HENT:   [x] Normocephalic, atraumatic.   [] Abnormal   [x] Mouth/Throat: Mucous membranes are moist.     External Ears [x] Normal  [] Abnormal-     Neck: [x] No visualized mass     Pulmonary/Chest: [x] Respiratory effort normal.  [x] No visualized signs of difficulty breathing or respiratory distress        [] Abnormal-      Musculoskeletal:   [x] Normal gait with no signs of ataxia         [x] Normal range of motion of neck        [] Abnormal-       Neurological:        [x] No Facial Asymmetry (Cranial nerve 7 motor function) (limited exam to video visit)          [x] No gaze palsy        [] Abnormal-         Skin:        [x] No significant exanthematous lesions or discoloration noted on facial skin         [] Abnormal-            Psychiatric:       [x] Normal Affect [x] No Hallucinations        [] Abnormal-     Other pertinent observable physical exam findings-     ASSESSMENT/PLAN:   Diagnosis Orders   1. Well adult exam  CBC Auto Differential    Comprehensive Metabolic Panel    T4, Free    Lipid Panel    TSH without Reflex    Vitamin D 25 Hydroxy   2. Vaginal dryness, menopausal  estradiol (ESTRACE) 1 MG tablet    CBC Auto Differential    Comprehensive Metabolic Panel    T4, Free    Lipid Panel    TSH without Reflex    Vitamin D 25 Hydroxy   3. Primary insomnia  zolpidem (AMBIEN) 10 MG tablet    CBC Auto Differential    Comprehensive Metabolic Panel    T4, Free    Lipid Panel    TSH without Reflex    Vitamin D 25 Hydroxy   4. Anxiety  ALPRAZolam (XANAX) 0.25 MG tablet    CBC Auto Differential    Comprehensive Metabolic Panel    T4, Free    Lipid Panel    TSH without Reflex    Vitamin D 25 Hydroxy   5. Vitamin D deficiency  CBC Auto Differential    Comprehensive Metabolic Panel    T4, Free    Lipid Panel    TSH without Reflex    Vitamin D 25 Hydroxy   6. Screening mammogram for breast cancer  VANI DIGITAL SCREEN W OR WO CAD BILATERAL      Orders Placed This Encounter   Procedures    VANI DIGITAL SCREEN W OR WO CAD BILATERAL    CBC Auto Differential    Comprehensive Metabolic Panel    T4, Free    Lipid Panel    TSH without Reflex    Vitamin D 25 Hydroxy     Requested Prescriptions     Signed Prescriptions Disp Refills    estradiol (ESTRACE) 1 MG tablet 90 tablet 3     Sig: TAKE 1 TABLET BY MOUTH ONE TIME A DAY    FLUoxetine (PROZAC) 20 MG capsule 90 capsule 3     Sig: Take 1 capsule by mouth daily    zolpidem (AMBIEN) 10 MG tablet 30 tablet 0     Sig: Take 1 tablet by mouth nightly as needed for Sleep for up to 30 days.  ALPRAZolam (XANAX) 0.25 MG tablet 30 tablet 0     Sig: Take 1 tablet by mouth nightly as needed for Sleep for up to 30 days. Return pap. Mary Vazquez is a 45 y.o. female being evaluated by a Virtual Visit (video visit) encounter to address concerns as mentioned above.   A caregiver was present when

## 2021-10-26 ENCOUNTER — HOSPITAL ENCOUNTER (OUTPATIENT)
Dept: MAMMOGRAPHY | Age: 51
Discharge: HOME | End: 2021-10-28
Payer: COMMERCIAL

## 2021-10-26 DIAGNOSIS — Z12.31 SCREENING MAMMOGRAM FOR BREAST CANCER: ICD-10-CM

## 2021-10-26 PROCEDURE — 77063 BREAST TOMOSYNTHESIS BI: CPT

## 2021-12-02 ENCOUNTER — HOSPITAL ENCOUNTER (OUTPATIENT)
Age: 51
Discharge: HOME | End: 2021-12-02

## 2021-12-02 LAB
HBV SURFACE AB TITR SER: 47.5 MIU/ML
RUBV IGG SER QL: >500 IU/ML

## 2021-12-02 PROCEDURE — 86765 RUBEOLA ANTIBODY: CPT

## 2021-12-02 PROCEDURE — 86787 VARICELLA-ZOSTER ANTIBODY: CPT

## 2021-12-02 PROCEDURE — 86735 MUMPS ANTIBODY: CPT

## 2021-12-02 PROCEDURE — 86317 IMMUNOASSAY INFECTIOUS AGENT: CPT

## 2021-12-02 PROCEDURE — 86762 RUBELLA ANTIBODY: CPT

## 2021-12-02 PROCEDURE — 86481 TB AG RESPONSE T-CELL SUSP: CPT

## 2021-12-03 LAB
MEASLES ANTIBODY IGG: 4.83
MUV IGG SER QL: 1.67
VZV IGG SER QL IA: 3.02

## 2021-12-04 LAB — T-SPOT TB TEST: NORMAL

## 2022-01-18 ENCOUNTER — HOSPITAL ENCOUNTER (OUTPATIENT)
Age: 52
Setting detail: OUTPATIENT SURGERY
Discharge: HOME | End: 2022-01-18
Attending: PLASTIC SURGERY | Admitting: PLASTIC SURGERY
Payer: COMMERCIAL

## 2022-01-18 VITALS
HEART RATE: 48 BPM | SYSTOLIC BLOOD PRESSURE: 149 MMHG | TEMPERATURE: 97.6 F | DIASTOLIC BLOOD PRESSURE: 76 MMHG | RESPIRATION RATE: 24 BRPM | BODY MASS INDEX: 23.86 KG/M2 | WEIGHT: 152 LBS | OXYGEN SATURATION: 97 % | HEIGHT: 67 IN

## 2022-01-18 DIAGNOSIS — G89.18 PAIN FOLLOWING SURGERY OR PROCEDURE: Primary | ICD-10-CM

## 2022-01-18 PROCEDURE — 3600000002 HC SURGERY LEVEL 2 BASE: Performed by: PLASTIC SURGERY

## 2022-01-18 PROCEDURE — 2709999900 HC NON-CHARGEABLE SUPPLY: Performed by: PLASTIC SURGERY

## 2022-01-18 PROCEDURE — 3600000012 HC SURGERY LEVEL 2 ADDTL 15MIN: Performed by: PLASTIC SURGERY

## 2022-01-18 PROCEDURE — 7100000010 HC PHASE II RECOVERY - FIRST 15 MIN: Performed by: PLASTIC SURGERY

## 2022-01-18 PROCEDURE — 88305 TISSUE EXAM BY PATHOLOGIST: CPT

## 2022-01-18 PROCEDURE — 2500000003 HC RX 250 WO HCPCS: Performed by: PLASTIC SURGERY

## 2022-01-18 RX ORDER — BUPIVACAINE HYDROCHLORIDE AND EPINEPHRINE 2.5; 5 MG/ML; UG/ML
INJECTION, SOLUTION INFILTRATION; PERINEURAL
Status: DISCONTINUED
Start: 2022-01-18 | End: 2022-01-18 | Stop reason: HOSPADM

## 2022-01-18 RX ORDER — HYDROCODONE BITARTRATE AND ACETAMINOPHEN 5; 325 MG/1; MG/1
1 TABLET ORAL EVERY 6 HOURS PRN
Qty: 10 TABLET | Refills: 0 | Status: SHIPPED | OUTPATIENT
Start: 2022-01-18 | End: 2022-01-21

## 2022-01-18 RX ORDER — BUPIVACAINE HYDROCHLORIDE AND EPINEPHRINE 2.5; 5 MG/ML; UG/ML
INJECTION, SOLUTION EPIDURAL; INFILTRATION; INTRACAUDAL; PERINEURAL PRN
Status: DISCONTINUED | OUTPATIENT
Start: 2022-01-18 | End: 2022-01-18 | Stop reason: ALTCHOICE

## 2022-01-18 RX ORDER — CEPHALEXIN 500 MG/1
500 CAPSULE ORAL 3 TIMES DAILY
Qty: 21 CAPSULE | Refills: 0 | Status: SHIPPED | OUTPATIENT
Start: 2022-01-18 | End: 2022-01-25

## 2022-01-18 ASSESSMENT — PAIN - FUNCTIONAL ASSESSMENT: PAIN_FUNCTIONAL_ASSESSMENT: 0-10

## 2022-01-18 ASSESSMENT — PAIN SCALES - GENERAL: PAINLEVEL_OUTOF10: 0

## 2022-01-18 NOTE — OP NOTE
Operative Note      Patient: Servando Irizarry  YOB: 1970  MRN: [id number]    Date of Procedure: 1/18/2022    Pre-Op Diagnosis: D48.5 SKIN LESIONS, LEFT HIP, LEFT AXILLARY, RIGHT POPLITEAL, RIGHT LOWER BACK X 2, DORSUM LEFT FOOT    Post-Op Diagnosis: Same       Procedure(s):  LEFT HIP, LEFT AXILLARY, RIGHT POPLITEAL, RIGHT LOWER BACK X2, AND DORSUM LEFT FOOT  LESION BIOPSY EXCISIONS  1. Excisional biopsy of irregular shaped pigmented lesion left axilla, excised diameter 1.2 cm closed with a 1.5 cm multilayer closure. 2.  Excisional biopsy of irregular shaped pigmented lesion left foot dorsum, excised diameter 1.3 cm closed with a 1.5 cm multilayer closure  3. Excisional biopsy of irregular shaped pigmented lesion left lateral thigh, excised diameter 0.7 cm closed with a 1 cm multilayer closure  4. Excisional biopsy of irregular shaped pigmented lesion right popliteal, excised diameter 0.8 cm closed with 1.1 cm multilayer closure  5. Excisional biopsy of irregular shaped pigmented lesion right lower back medial, excised diameter 1.2 cm closed with a 1.5 cm multilayer closure  6. Excisional biopsy of irregular shaped pigmented lesion right lower back lateral, excised diameter 1.3 cm closed with 1.5 cm multilayer closure    Surgeon(s):   Joss Escamilla MD    Assistant:   * No surgical staff found *    Anesthesia: Local    Estimated Blood Loss (mL): Minimal    Complications: None    Specimens:   ID Type Source Tests Collected by Time Destination   A : LEFT FOOT LESION Tissue Tissue SURGICAL PATHOLOGY NED Novoa MD 1/18/2022 1406    B : LEFT THIGH LESION Tissue Tissue SURGICAL PATHOLOGY END Novoa MD 1/18/2022 1409    C : LEFT AXILLA LESION Tissue Tissue SURGICAL PATHOLOGY NED Novoa MD 1/18/2022 1411    D : RIGHT LOWER BACK MEDIAL LESION Tissue Tissue SURGICAL PATHOLOGY NED Novoa MD 1/18/2022 1413    E : RIGHT LOWER BACK LATERAL LESION Tissue Tissue SURGICAL PATHOLOGY A Rasta Choudhury MD 1/18/2022 1421    F : RIGHT POSTERIOR KNEE LESION Tissue Tissue SURGICAL PATHOLOGY A Rasta Choudhury MD 1/18/2022 1422        Implants:  * No implants in log *      Drains: * No LDAs found *    Findings: Good tissue approximation. Detailed Description of Procedure: The patient was brought in the operating room and laid in supine position. Her left axilla, left foot, left lateral thigh, were prepped and draped in sterile fashion. 0.25% Marcaine with epinephrine was injected into the tissue surrounding each of the 4 lesions. Each lesion was excised with #15 blade and submitted for specimen. The incisions were closed in a multilayered fashion with 4 Monocryl deep dermal layer, 4-0 Monocryl dermal layer, and 4-0 Monocryl running subcuticular on the skin followed by Dermabond and Steri-Strips. The patient was then rolled into the prone position. She had 2 lesions on the lower back and one on the right popliteal region. The lesions were prepped and draped in sterile fashion. 0.25% Marcaine with epinephrine was injected into the tissue surrounding each of the 3 lesions. Each lesion was excised with #15 blade submitted for specimen. The incisions were closed in a multilayered fashion with 4 Monocryl deep dermal layer, 4-0 Monocryl dermal layer, and 4-0 Monocryl running subcuticular on the skin followed by Dermabond and Steri-Strips. The excised diameters and multilayer closures are listed below:    1. Excisional biopsy of irregular shaped pigmented lesion left axilla, excised diameter 1.2 cm closed with a 1.5 cm multilayer closure. 2.  Excisional biopsy of irregular shaped pigmented lesion left foot dorsum, excised diameter 1.3 cm closed with a 1.5 cm multilayer closure  3. Excisional biopsy of irregular shaped pigmented lesion left lateral thigh, excised diameter 0.7 cm closed with a 1 cm multilayer closure  4.   Excisional biopsy of irregular shaped pigmented lesion right popliteal, excised diameter 0.8 cm closed with 1.1 cm multilayer closure  5. Excisional biopsy of irregular shaped pigmented lesion right lower back medial, excised diameter 1.2 cm closed with a 1.5 cm multilayer closure  6. Excisional biopsy of irregular shaped pigmented lesion right lower back lateral, excised diameter 1.3 cm closed with 1.5 cm multilayer closure    Patient tolerated the procedure well was taken to postop recovery in stable condition.   Electronically signed by Matias Byrd MD on 1/18/2022 at 2:34 PM

## 2022-01-18 NOTE — H&P
Office Note     LUIZ Jimenez MD, FACS     Subjective:      Patient ID: Federica Hamlin is a 45 y.o. female.     HPI     Patient comes in today for a skin check. She has a previous history of melanoma in situ. She denies any changes to her lesions that she can see but she has multiple irregular shaped pigmented lesions on her trunk that she would like to have evaluated especially given her history.     Review of Systems     Past Medical History        Past Medical History:   Diagnosis Date    History of gastroesophageal reflux (GERD)       very mild and intermittent    History of stress test      WNL    Kidney stones  7 2016     X 2 PASSED ON OWN    LA (lupus anticoagulant) disorder ([facility]      Pt also has Anti-RO (SSA-SSB)    Melanoma ([facility] 2018     RT LOWER BACK    Melanoma ([facility]      Rheumatoid arthritis(714.0)      HAS ANTIBDIES BUT NO SYMPTOMS    Skin lesion of foot       UNDER NAIL LEFT GREAT TOE    Wears glasses       READING         Past Surgical History         Past Surgical History:   Procedure Laterality Date    ABDOMEN SURGERY   ,      C SECTION    ABDOMEN SURGERY   2007     JOSHUA WITH RSO    ABDOMEN SURGERY   2008     LAPAROTOMY WITH EVIE  AND LSO     SECTION   ,     FOOT SURGERY Left 2020     BIOPSY LEFT BIG TOE performed by Sirena Jean MD at [facility]   2007     Dr. Breanna Cavazos -[facility] + [address]   08     Álvarez-EVIE and LSO     LEG BIOPSY EXCISION Right 2019     LEG LESION BIOPSY EXCISION - THIGH RIGHT/FOOT LESION BIOPSY EXCISION - SECOND TOE LEFT performed by [facility]rena Jean MD at [facility] Left 2020     Biopsy Left Big Toe    MA EXCISION TUMOR SOFT TISSUE BACK/FLANK SUBQ <3CM N/A 6/10/2019     Excision and biopsy upper abdomen, back and right medial thigh. LEFT MEDIAL THIGH performed by Sirena Jean MD at [address] [place] [address]  [place] OFFICE/OUTPT VISIT,PROCEDURE ONLY N/A 10/9/2018     EXCISION SKIN LESION MID LOWER BACK, RIGHT POST. AURICULAR performed by Devan Jeffrey MD at [address] [place] / [address]   06/10/2019     upper abdomen, upper back, Rt medial thigh, Left medial knee    PRE-MALIGNANT / BENIGN SKIN LESION EXCISION   10/06/2020     BACK LESION BIOPSY EXCISION - IRREGULAR PIGMENTED LESION X 4    PRE-MALIGNANT / BENIGN SKIN LESION EXCISION   2021      IRREGULAR PIGMENTED  LESION BIOPSY EXCISION X 4 BACK (    SKIN BIOPSY N/A 10/6/2020     BACK LESION BIOPSY EXCISION - IRREGULAR PIGMENTED LESION X 4 performed by Devan Jeffrey MD at [facility] N/A 2021     IRREGULAR PIGMENTED  LESION BIOPSY EXCISION X 4 BACK performed by Devan Jeffrey MD at [address] [place] [address] N/A 10/09/2018     mid lower back and right post auricular         No Known Allergies  Current Facility-Administered Medications          Current Outpatient Medications   Medication Sig Dispense Refill    estradiol (ESTRACE) 1 MG tablet TAKE 1 TABLET BY MOUTH ONE TIME A DAY 90 tablet 3    FLUoxetine (PROZAC) 20 MG capsule Take 1 capsule by mouth daily 90 capsule 3    Multiple Vitamins-Minerals (THERAPEUTIC MULTIVITAMIN-MINERALS) tablet Take 1 tablet by mouth daily          No current facility-administered medications for this visit.         Social History               Socioeconomic History    Marital status:        Spouse name: Not on file    Number of children: 2    Years of education: college     Highest education level: Not on file   Occupational History    Occupation:  NP    Tobacco Use    Smoking status: Never Smoker    Smokeless tobacco: Never Used   Vaping Use    Vaping Use: Never used   Substance and Sexual Activity    Alcohol use:  Yes       Comment: WINE 3 GLASSES A MONTH    Drug use: No    Sexual activity: Yes       Partners: Male   Other Topics Concern    Not on file   Social History Narrative    Not on file      Social Determinants of Health          Financial Resource Strain:     Difficulty of Paying Living Expenses: Not on file   Food Insecurity:     Worried About [address] in the Last Year: Not on file    Meng of Food in the Last Year: Not on file   Transportation Needs:     Lack of Transportation (Medical): Not on file    Lack of Transportation (Non-Medical): Not on file   Physical Activity:     Days of Exercise per Week: Not on file    Minutes of Exercise per Session: Not on file   Stress:     Feeling of Stress : Not on file   Social Connections:     Frequency of Communication with Friends and Family: Not on file    Frequency of Social Gatherings with Friends and Family: Not on file    Attends Jain Services: Not on file    Active Member of [address] [place] [address] or Organizations: Not on file    Attends Club or Organization Meetings: Not on file    Marital Status: Not on file   Intimate Partner Violence:     Fear of Current or Ex-Partner: Not on file    Emotionally Abused: Not on file    Physically Abused: Not on file    Sexually Abused: Not on file   Housing Stability:     Unable to Pay for Housing in the Last Year: Not on file    Number of [place] in the Last Year: Not on file    Unstable Housing in the Last Year: Not on file         Family History         Family History   Problem Relation Age of Onset    Osteoporosis Mother      Cervical Cancer Mother      High Blood Pressure Mother      Other Mother           SMALL AORTIC ANUERYSM    Heart Disease Father      Heart Attack Father           CAUSED HIS DEATH    Diabetes Paternal Grandmother           Review of systems is otherwise negative. /70   Pulse 53   Resp 20   Ht 5' 7\" (1.702 m)   Wt 145 lb (65.8 kg)   LMP 07/29/2007   BMI 22.71 kg/m²         Objective:   Physical Exam  Vitals and nursing note reviewed.  Exam conducted with a chaperone present. Constitutional:       Appearance: Normal appearance. She is well-developed. She is not diaphoretic. HENT:      Head: Normocephalic and atraumatic. Nose: Nose normal.   Eyes:      Extraocular Movements: Extraocular movements intact. Conjunctiva/sclera: Conjunctivae normal.      Pupils: Pupils are equal, round, and reactive to light. Neck:      Vascular: No JVD. Trachea: No tracheal deviation. Cardiovascular:      Rate and Rhythm: Normal rate. Pulmonary:      Effort: Pulmonary effort is normal. No respiratory distress. Breath sounds: No wheezing. Abdominal:      General: There is no distension. Palpations: Abdomen is soft. Musculoskeletal:         General: No tenderness. Normal range of motion. Cervical back: Normal range of motion. Lymphadenopathy:      Cervical: No cervical adenopathy. Skin:     General: Skin is warm and dry. Coloration: Skin is not pale. Findings: No erythema or rash. Nails: There is no clubbing. Comments: Irregularly shaped pigmented lesions on the left axilla approximately 0.5 cm ,right lower back 0.4 cm and 0.4 cm, left dorsum of foot approximately 0.4 cm,right popliteal lesion 0.5 cm, and left hip approximately 0.5 cm. Neurological:      Mental Status: She is alert and oriented to person, place, and time. Cranial Nerves: No cranial nerve deficit. Psychiatric:         Mood and Affect: Mood normal.         Speech: Speech normal.         Behavior: Behavior normal.         Thought Content: Thought content normal.         Judgment: Judgment normal.            Assessment:        Diagnosis Orders   1. Neoplasm of uncertain behavior of skin                       Plan: We will get her scheduled under local anesthesia for excisional biopsy            The patient was evaluated and examined with my nurse in the room at all times.   Portions of this note were transcribed using Dragon voice recognition technology

## 2022-01-20 LAB — DERMATOLOGY PATHOLOGY REPORT: NORMAL

## 2022-11-01 ENCOUNTER — HOSPITAL ENCOUNTER (OUTPATIENT)
Dept: MAMMOGRAPHY | Age: 52
Discharge: HOME | End: 2022-11-03
Payer: COMMERCIAL

## 2022-11-01 DIAGNOSIS — Z12.31 VISIT FOR SCREENING MAMMOGRAM: ICD-10-CM

## 2022-11-01 PROCEDURE — 77063 BREAST TOMOSYNTHESIS BI: CPT

## 2022-11-16 ENCOUNTER — HOSPITAL ENCOUNTER (OUTPATIENT)
Age: 52
Setting detail: SPECIMEN
Discharge: HOME | End: 2022-11-16

## 2022-11-16 ENCOUNTER — OFFICE VISIT (OUTPATIENT)
Dept: FAMILY MEDICINE CLINIC | Age: 52
End: 2022-11-16
Payer: COMMERCIAL

## 2022-11-16 VITALS
SYSTOLIC BLOOD PRESSURE: 131 MMHG | WEIGHT: 156 LBS | OXYGEN SATURATION: 98 % | HEART RATE: 61 BPM | DIASTOLIC BLOOD PRESSURE: 78 MMHG | HEIGHT: 67 IN | BODY MASS INDEX: 24.48 KG/M2

## 2022-11-16 DIAGNOSIS — Z12.31 SCREENING MAMMOGRAM FOR BREAST CANCER: ICD-10-CM

## 2022-11-16 DIAGNOSIS — N81.10 FEMALE BLADDER PROLAPSE: ICD-10-CM

## 2022-11-16 DIAGNOSIS — N39.3 STRESS INCONTINENCE: ICD-10-CM

## 2022-11-16 DIAGNOSIS — F51.01 PRIMARY INSOMNIA: ICD-10-CM

## 2022-11-16 DIAGNOSIS — N89.8 VAGINAL DRYNESS: ICD-10-CM

## 2022-11-16 DIAGNOSIS — Z00.00 WELL ADULT EXAM: ICD-10-CM

## 2022-11-16 DIAGNOSIS — N95.1 VAGINAL DRYNESS, MENOPAUSAL: ICD-10-CM

## 2022-11-16 DIAGNOSIS — Z01.411 ENCOUNTER FOR GYNECOLOGICAL EXAMINATION WITH ABNORMAL FINDING: ICD-10-CM

## 2022-11-16 DIAGNOSIS — F41.9 ANXIETY: ICD-10-CM

## 2022-11-16 DIAGNOSIS — Z01.419 ENCOUNTER FOR GYNECOLOGICAL EXAMINATION: Primary | ICD-10-CM

## 2022-11-16 PROCEDURE — 99396 PREV VISIT EST AGE 40-64: CPT | Performed by: FAMILY MEDICINE

## 2022-11-16 RX ORDER — OSPEMIFENE 60 MG/1
60 TABLET, FILM COATED ORAL DAILY
Qty: 90 TABLET | Refills: 3 | Status: SHIPPED | OUTPATIENT
Start: 2022-11-16

## 2022-11-16 RX ORDER — ESTRADIOL 1 MG/1
TABLET ORAL
Qty: 90 TABLET | Refills: 3 | Status: SHIPPED | OUTPATIENT
Start: 2022-11-16

## 2022-11-16 RX ORDER — ALPRAZOLAM 0.25 MG/1
0.25 TABLET ORAL NIGHTLY PRN
Qty: 30 TABLET | Refills: 0 | Status: SHIPPED | OUTPATIENT
Start: 2022-11-16 | End: 2022-12-16

## 2022-11-16 RX ORDER — ZOLPIDEM TARTRATE 10 MG/1
10 TABLET ORAL NIGHTLY PRN
Qty: 30 TABLET | Refills: 0 | Status: SHIPPED | OUTPATIENT
Start: 2022-11-16 | End: 2022-12-16

## 2022-11-16 SDOH — ECONOMIC STABILITY: FOOD INSECURITY: WITHIN THE PAST 12 MONTHS, YOU WORRIED THAT YOUR FOOD WOULD RUN OUT BEFORE YOU GOT MONEY TO BUY MORE.: NEVER TRUE

## 2022-11-16 SDOH — ECONOMIC STABILITY: FOOD INSECURITY: WITHIN THE PAST 12 MONTHS, THE FOOD YOU BOUGHT JUST DIDN'T LAST AND YOU DIDN'T HAVE MONEY TO GET MORE.: NEVER TRUE

## 2022-11-16 ASSESSMENT — SOCIAL DETERMINANTS OF HEALTH (SDOH): HOW HARD IS IT FOR YOU TO PAY FOR THE VERY BASICS LIKE FOOD, HOUSING, MEDICAL CARE, AND HEATING?: NOT HARD AT ALL

## 2022-11-16 ASSESSMENT — PATIENT HEALTH QUESTIONNAIRE - PHQ9
SUM OF ALL RESPONSES TO PHQ9 QUESTIONS 1 & 2: 0
2. FEELING DOWN, DEPRESSED OR HOPELESS: 0
SUM OF ALL RESPONSES TO PHQ QUESTIONS 1-9: 0
1. LITTLE INTEREST OR PLEASURE IN DOING THINGS: 0
SUM OF ALL RESPONSES TO PHQ QUESTIONS 1-9: 0

## 2022-11-16 NOTE — PROGRESS NOTES
Subjective:      Jordyn Macario is a 45 y.o. female who presents for an annual exam. The patient has  complaints today. The patient is sexually active. He states she still has a lot of problems with vaginal dryness she also has discomfort in her vaginal area  She also is having problems with stress incontinence and think she has bladder prolapse and would like to do something about that  She would also like to stop taking Prozac she does not feel she needs it anymore  Wears seatbelts: yes last pap: was normal  Regular exercise: no  Ever been transfused or tattooed?: no  The patient reports that domestic violence in her life is absent. Menstrual History:  OB History          3    Para   3    Term   2       1    AB        Living   2         SAB        IAB        Ectopic        Molar        Multiple        Live Births   2               Menarche age: 14  Patient's last menstrual period was 2007. Patient's medications, allergies, past medical, surgical, social and family histories were reviewed and updated as appropriate. Review of Systems  A comprehensive review of systems was negative.       Objective:      /78   Pulse 61   Ht 5' 7\" (1.702 m)   Wt 156 lb (70.8 kg)   LMP 2007   SpO2 98%   BMI 24.43 kg/m²     General Appearance:    Alert, cooperative, no distress, appears stated age   Head:    Normocephalic, without obvious abnormality, atraumatic   Eyes:    PERRL, conjunctiva/corneas clear, EOM's intact, fundi     benign, both eyes   Ears:    Normal TM's and external ear canals, both ears   Nose:   Nares normal, septum midline, mucosa normal, no drainage    or sinus tenderness   Throat:   Lips, mucosa, and tongue normal; teeth and gums normal   Neck:   Supple, symmetrical, trachea midline, no adenopathy;     thyroid:  no enlargement/tenderness/nodules; no carotid    bruit or JVD   Back:     Symmetric, no curvature, ROM normal, no CVA tenderness   Lungs:     Clear to auscultation bilaterally, respirations unlabored   Chest Wall:    No tenderness or deformity    Heart:    Regular rate and rhythm, S1 and S2 normal, no murmur, rub   or gallop   Breast Exam:    No tenderness, masses, or nipple abnormality   Abdomen:     Soft, non-tender, bowel sounds active all four quadrants,     no masses, no organomegaly   Genitalia:    Normal female without lesion, discharge or  vagvault tenderness   Rectal:    Normal tone ;guaiac negative stool   Extremities:   Extremities normal, atraumatic, no cyanosis or edema   Pulses:   2+ and symmetric all extremities   Skin:   Skin color, texture, turgor normal, no rashes or lesions   Lymph nodes:   Cervical, supraclavicular, and axillary nodes normal   Neurologic:   CNII-XII intact, normal strength, sensation and reflexes     throughout   . Assessment:     1. Encounter for gynecological examination    2. Screening mammogram for breast cancer    3. Well adult exam    4. Vaginal dryness, menopausal    5. Primary insomnia    6. Anxiety    7. Vaginal dryness    8. Female bladder prolapse    9. Stress incontinence         Healthy female exam.      Plan:      Orders Placed This Encounter   Procedures    VANI DIGITAL SCREEN W OR WO CAD BILATERAL    PAP Smear    CBC with Auto Differential    Comprehensive Metabolic Panel    Lipid Panel    T4, Free    Vitamin D 25 Hydroxy    TSH    Shayan Smith DO, Urogynecology, [place]     Requested Prescriptions     Signed Prescriptions Disp Refills    Ospemifene (OSPHENA) 60 MG TABS 90 tablet 3     Sig: Take 60 mg by mouth daily    estradiol (ESTRACE) 1 MG tablet 90 tablet 3     Sig: TAKE 1 TABLET BY MOUTH ONE TIME A DAY    zolpidem (AMBIEN) 10 MG tablet 30 tablet 0     Sig: Take 1 tablet by mouth nightly as needed for Sleep for up to 30 days. ALPRAZolam (XANAX) 0.25 MG tablet 30 tablet 0     Sig: Take 1 tablet by mouth nightly as needed for Sleep for up to 30 days.         All questions answered  Await Pap smear results.

## 2022-12-02 ENCOUNTER — HOSPITAL ENCOUNTER (OUTPATIENT)
Age: 52
Discharge: HOME | End: 2022-12-02
Payer: COMMERCIAL

## 2022-12-02 DIAGNOSIS — Z00.00 WELL ADULT EXAM: ICD-10-CM

## 2022-12-02 LAB
ABSOLUTE EOS #: 0.14 K/UL (ref 0–0.44)
ABSOLUTE IMMATURE GRANULOCYTE: <0.03 K/UL (ref 0–0.3)
ABSOLUTE LYMPH #: 2.74 K/UL (ref 1.1–3.7)
ABSOLUTE MONO #: 0.55 K/UL (ref 0.1–1.2)
ALBUMIN SERPL-MCNC: 4.3 G/DL (ref 3.5–5.2)
ALBUMIN/GLOBULIN RATIO: 1.5 (ref 1–2.5)
ALP BLD-CCNC: 61 U/L (ref 35–104)
ALT SERPL-CCNC: 12 U/L (ref 5–33)
ANION GAP SERPL CALCULATED.3IONS-SCNC: 11 MMOL/L (ref 9–17)
AST SERPL-CCNC: 18 U/L
BASOPHILS # BLD: 0 % (ref 0–2)
BASOPHILS ABSOLUTE: <0.03 K/UL (ref 0–0.2)
BILIRUB SERPL-MCNC: 0.4 MG/DL (ref 0.3–1.2)
BUN BLDV-MCNC: 13 MG/DL (ref 6–20)
CALCIUM SERPL-MCNC: 9.2 MG/DL (ref 8.6–10.4)
CHLORIDE BLD-SCNC: 105 MMOL/L (ref 98–107)
CHOLESTEROL/HDL RATIO: 5.7
CHOLESTEROL: 263 MG/DL
CO2: 26 MMOL/L (ref 20–31)
CREAT SERPL-MCNC: 0.69 MG/DL (ref 0.5–0.9)
EOSINOPHILS RELATIVE PERCENT: 2 % (ref 1–4)
GFR SERPL CREATININE-BSD FRML MDRD: >60 ML/MIN/1.73M2
GLUCOSE BLD-MCNC: 93 MG/DL (ref 70–99)
HCT VFR BLD CALC: 40.9 % (ref 36.3–47.1)
HDLC SERPL-MCNC: 46 MG/DL
HEMOGLOBIN: 13.5 G/DL (ref 11.9–15.1)
IMMATURE GRANULOCYTES: 0 %
LDL CHOLESTEROL: 160 MG/DL (ref 0–130)
LYMPHOCYTES # BLD: 45 % (ref 24–43)
MCH RBC QN AUTO: 29.6 PG (ref 25.2–33.5)
MCHC RBC AUTO-ENTMCNC: 33 G/DL (ref 28.4–34.8)
MCV RBC AUTO: 89.7 FL (ref 82.6–102.9)
MONOCYTES # BLD: 9 % (ref 3–12)
NRBC AUTOMATED: 0 PER 100 WBC
PDW BLD-RTO: 12 % (ref 11.8–14.4)
PLATELET # BLD: 271 K/UL (ref 138–453)
PMV BLD AUTO: 10 FL (ref 8.1–13.5)
POTASSIUM SERPL-SCNC: 4 MMOL/L (ref 3.7–5.3)
RBC # BLD: 4.56 M/UL (ref 3.95–5.11)
SEG NEUTROPHILS: 44 % (ref 36–65)
SEGMENTED NEUTROPHILS ABSOLUTE COUNT: 2.71 K/UL (ref 1.5–8.1)
SODIUM BLD-SCNC: 142 MMOL/L (ref 135–144)
THYROXINE, FREE: 1.16 NG/DL (ref 0.93–1.7)
TOTAL PROTEIN: 7.1 G/DL (ref 6.4–8.3)
TRIGL SERPL-MCNC: 286 MG/DL
TSH SERPL DL<=0.05 MIU/L-ACNC: 3.1 UIU/ML (ref 0.3–5)
VITAMIN D 25-HYDROXY: 29.2 NG/ML
WBC # BLD: 6.2 K/UL (ref 3.5–11.3)

## 2022-12-02 PROCEDURE — 84443 ASSAY THYROID STIM HORMONE: CPT

## 2022-12-02 PROCEDURE — 80053 COMPREHEN METABOLIC PANEL: CPT

## 2022-12-02 PROCEDURE — 82306 VITAMIN D 25 HYDROXY: CPT

## 2022-12-02 PROCEDURE — 36415 COLL VENOUS BLD VENIPUNCTURE: CPT

## 2022-12-02 PROCEDURE — 85025 COMPLETE CBC W/AUTO DIFF WBC: CPT

## 2022-12-02 PROCEDURE — 84439 ASSAY OF FREE THYROXINE: CPT

## 2022-12-02 PROCEDURE — 80061 LIPID PANEL: CPT

## 2022-12-05 DIAGNOSIS — E78.2 MIXED HYPERLIPIDEMIA: Primary | ICD-10-CM

## 2022-12-05 RX ORDER — ATORVASTATIN CALCIUM 20 MG/1
20 TABLET, FILM COATED ORAL DAILY
Qty: 30 TABLET | Refills: 2 | Status: SHIPPED | OUTPATIENT
Start: 2022-12-05

## 2022-12-14 ENCOUNTER — PATIENT MESSAGE (OUTPATIENT)
Dept: FAMILY MEDICINE CLINIC | Age: 52
End: 2022-12-14

## 2022-12-14 DIAGNOSIS — B35.1 ONYCHOMYCOSIS: Primary | ICD-10-CM

## 2022-12-14 NOTE — TELEPHONE ENCOUNTER
From: Bobby Braden  To: [facility]: 12/14/2022 10:28 AM EST  Subject: Specimen    I had mentioned I had a toenail with a questionable fungus. The nail was not long enough to take a specimen so I was supposed to be giving a specimen cup and drop it off to lab. I have no order for this. Can I get one called into [facility]?

## 2023-01-04 ENCOUNTER — HOSPITAL ENCOUNTER (OUTPATIENT)
Age: 53
Setting detail: SPECIMEN
Discharge: HOME | End: 2023-01-04
Payer: COMMERCIAL

## 2023-01-04 DIAGNOSIS — B35.1 ONYCHOMYCOSIS: ICD-10-CM

## 2023-01-04 PROCEDURE — 87101 SKIN FUNGI CULTURE: CPT

## 2023-01-19 RX ORDER — TERBINAFINE HYDROCHLORIDE 250 MG/1
250 TABLET ORAL DAILY
Qty: 90 TABLET | Refills: 0 | Status: SHIPPED | OUTPATIENT
Start: 2023-01-19 | End: 2023-04-19

## 2023-05-23 ENCOUNTER — HOSPITAL ENCOUNTER (OUTPATIENT)
Age: 53
Discharge: HOME | End: 2023-05-23
Payer: COMMERCIAL

## 2023-05-23 LAB
CHOLEST SERPL-MCNC: 266 MG/DL
CHOLESTEROL/HDL RATIO: 4.9
HDLC SERPL-MCNC: 54 MG/DL
LDLC SERPL CALC-MCNC: 163 MG/DL (ref 0–130)
TRIGL SERPL-MCNC: 246 MG/DL

## 2023-05-23 PROCEDURE — 80061 LIPID PANEL: CPT

## 2023-05-23 PROCEDURE — 36415 COLL VENOUS BLD VENIPUNCTURE: CPT

## 2023-06-01 RX ORDER — ATORVASTATIN CALCIUM 20 MG/1
20 TABLET, FILM COATED ORAL DAILY
Qty: 30 TABLET | Refills: 2 | Status: SHIPPED | OUTPATIENT
Start: 2023-06-01

## 2023-06-01 NOTE — TELEPHONE ENCOUNTER
Fito Washington is calling to request a refill on the following medication(s):    Last Visit Date (If Applicable):  75/05/6339    Next Visit Date:    Visit date not found    Medication Request:  Requested Prescriptions     Pending Prescriptions Disp Refills    atorvastatin (LIPITOR) 20 MG tablet 30 tablet 2     Sig: Take 1 tablet by mouth daily

## 2023-06-04 DIAGNOSIS — E78.2 MIXED HYPERLIPIDEMIA: Primary | ICD-10-CM

## 2023-06-04 RX ORDER — ROSUVASTATIN CALCIUM 40 MG/1
40 TABLET, COATED ORAL DAILY
Qty: 90 TABLET | Refills: 1 | Status: SHIPPED | OUTPATIENT
Start: 2023-06-04

## 2023-07-24 ENCOUNTER — TELEPHONE (OUTPATIENT)
Dept: FAMILY MEDICINE CLINIC | Age: 53
End: 2023-07-24

## 2023-07-24 ASSESSMENT — PATIENT HEALTH QUESTIONNAIRE - PHQ9
SUM OF ALL RESPONSES TO PHQ9 QUESTIONS 1 & 2: 0
2. FEELING DOWN, DEPRESSED OR HOPELESS: 0
SUM OF ALL RESPONSES TO PHQ QUESTIONS 1-9: 0
1. LITTLE INTEREST OR PLEASURE IN DOING THINGS: NOT AT ALL
SUM OF ALL RESPONSES TO PHQ QUESTIONS 1-9: 0
SUM OF ALL RESPONSES TO PHQ QUESTIONS 1-9: 0
SUM OF ALL RESPONSES TO PHQ9 QUESTIONS 1 & 2: 0
2. FEELING DOWN, DEPRESSED OR HOPELESS: NOT AT ALL
SUM OF ALL RESPONSES TO PHQ QUESTIONS 1-9: 0
1. LITTLE INTEREST OR PLEASURE IN DOING THINGS: 0

## 2023-07-24 NOTE — TELEPHONE ENCOUNTER
----- Message from [facility] sent at 7/24/2023  9:07 AM EDT -----  Subject: Referral Request    Reason for referral request? Labs Blood work  Provider patient wants to be referred to(if known):     Provider Phone Number(if known): Additional Information for Provider?  If needed PT would to get labs done   before Annual physical Exam  ---------------------------------------------------------------------------  --------------  [address]    [phone]; OK to leave message on voicemail  ---------------------------------------------------------------------------  --------------

## 2023-07-24 NOTE — TELEPHONE ENCOUNTER
This question is very confusing is the patient wanting to get lab work done?   She would need an appointment to have anything ordered she has not been in since November

## 2023-07-31 ENCOUNTER — OFFICE VISIT (OUTPATIENT)
Dept: FAMILY MEDICINE CLINIC | Age: 53
End: 2023-07-31
Payer: COMMERCIAL

## 2023-07-31 VITALS
SYSTOLIC BLOOD PRESSURE: 130 MMHG | HEIGHT: 67 IN | DIASTOLIC BLOOD PRESSURE: 78 MMHG | HEART RATE: 58 BPM | WEIGHT: 152.2 LBS | OXYGEN SATURATION: 97 % | BODY MASS INDEX: 23.89 KG/M2

## 2023-07-31 DIAGNOSIS — F51.01 PRIMARY INSOMNIA: ICD-10-CM

## 2023-07-31 DIAGNOSIS — N95.1 VAGINAL DRYNESS, MENOPAUSAL: ICD-10-CM

## 2023-07-31 DIAGNOSIS — Z82.49 FAMILY HISTORY OF THORACIC AORTIC ANEURYSM: ICD-10-CM

## 2023-07-31 DIAGNOSIS — F41.9 ANXIETY: ICD-10-CM

## 2023-07-31 DIAGNOSIS — Z82.49 FAMILY HISTORY OF MI (MYOCARDIAL INFARCTION): ICD-10-CM

## 2023-07-31 DIAGNOSIS — R07.9 CHEST PAIN, UNSPECIFIED TYPE: Primary | ICD-10-CM

## 2023-07-31 PROCEDURE — 99214 OFFICE O/P EST MOD 30 MIN: CPT | Performed by: FAMILY MEDICINE

## 2023-07-31 RX ORDER — ALPRAZOLAM 0.25 MG/1
0.25 TABLET ORAL NIGHTLY PRN
Qty: 30 TABLET | Refills: 0 | Status: SHIPPED | OUTPATIENT
Start: 2023-07-31 | End: 2023-08-30

## 2023-07-31 RX ORDER — ESTRADIOL 1 MG/1
TABLET ORAL
Qty: 90 TABLET | Refills: 3 | Status: SHIPPED | OUTPATIENT
Start: 2023-07-31

## 2023-07-31 RX ORDER — ZOLPIDEM TARTRATE 10 MG/1
10 TABLET ORAL NIGHTLY PRN
Qty: 30 TABLET | Refills: 0 | Status: SHIPPED | OUTPATIENT
Start: 2023-07-31 | End: 2023-08-30

## 2023-07-31 SDOH — ECONOMIC STABILITY: INCOME INSECURITY: HOW HARD IS IT FOR YOU TO PAY FOR THE VERY BASICS LIKE FOOD, HOUSING, MEDICAL CARE, AND HEATING?: NOT HARD AT ALL

## 2023-07-31 SDOH — ECONOMIC STABILITY: FOOD INSECURITY: WITHIN THE PAST 12 MONTHS, YOU WORRIED THAT YOUR FOOD WOULD RUN OUT BEFORE YOU GOT MONEY TO BUY MORE.: NEVER TRUE

## 2023-07-31 SDOH — ECONOMIC STABILITY: HOUSING INSECURITY
IN THE LAST 12 MONTHS, WAS THERE A TIME WHEN YOU DID NOT HAVE A STEADY PLACE TO SLEEP OR SLEPT IN A SHELTER (INCLUDING NOW)?: NO

## 2023-07-31 SDOH — ECONOMIC STABILITY: FOOD INSECURITY: WITHIN THE PAST 12 MONTHS, THE FOOD YOU BOUGHT JUST DIDN'T LAST AND YOU DIDN'T HAVE MONEY TO GET MORE.: NEVER TRUE

## 2023-07-31 NOTE — PROGRESS NOTES
[facility]  [address] [address]  Dept: [phone]      Afshan Tillman is a 47 y.o. female who presents today for follow up on her  medical conditions as noted below. Chief Complaint   Patient presents with    Annual Exam    Other     When she lays flat she has a weird feeling when she breaths in her her chest area        Patient Active Problem List:     Menopausal symptoms     Melanoma in situ ([address])     Neoplasm of uncertain behavior of skin     Dysplastic nevus     Past Medical History:   Diagnosis Date    History of gastroesophageal reflux (GERD)     very mild and intermittent    History of stress test     WNL    Kidney stones  7 2016    X 2 PASSED ON OWN    LA (lupus anticoagulant) disorder ([address])     Pt also has Anti-RO (SSA-SSB)    Melanoma ([address]) 2018    RT LOWER BACK    Melanoma ([address])     Rheumatoid arthritis(714[address])     HAS ANTIBDIES BUT NO SYMPTOMS    Skin lesion of foot     UNDER NAIL LEFT GREAT TOE    Wears glasses     READING      Past Surgical History:   Procedure Laterality Date    ABDOMEN SURGERY  ,     C SECTION    ABDOMEN SURGERY  2007    JOSHUA WITH RSO    ABDOMEN SURGERY  2008    LAPAROTOMY WITH EVIE  AND LSO     SECTION  ,     FOOT SURGERY Left 2020    BIOPSY LEFT BIG TOE performed by Wilfred Rodríguez MD at [facility] ([facility])  2007    Dr. Katherine Paz -JOSHUA + Andrew Cannon  08    Álvarez-EVIE and LSO     LEG BIOPSY EXCISION Right 2019    LEG LESION BIOPSY EXCISION - THIGH RIGHT/FOOT LESION BIOPSY EXCISION - SECOND TOE LEFT performed by Wilfred Rodríguez MD at [facility] Left 2020    Biopsy Left Big Toe    FL EXCISION TUMOR SOFT TISSUE BACK/FLANK SUBQ <3CM N/A 6/10/2019    Excision and biopsy upper abdomen, back and right medial thigh. LEFT MEDIAL THIGH performed by Aron Rose

## 2023-08-10 ENCOUNTER — HOSPITAL ENCOUNTER (OUTPATIENT)
Dept: CT IMAGING | Age: 53
Discharge: HOME | End: 2023-08-12
Attending: FAMILY MEDICINE
Payer: COMMERCIAL

## 2023-08-10 DIAGNOSIS — R07.9 CHEST PAIN, UNSPECIFIED TYPE: ICD-10-CM

## 2023-08-10 DIAGNOSIS — Z82.49 FAMILY HISTORY OF THORACIC AORTIC ANEURYSM: ICD-10-CM

## 2023-08-10 PROCEDURE — 71275 CT ANGIOGRAPHY CHEST: CPT

## 2023-08-10 PROCEDURE — 6360000004 HC RX CONTRAST MEDICATION: Performed by: FAMILY MEDICINE

## 2023-08-10 RX ADMIN — IOPAMIDOL 100 ML: 755 INJECTION, SOLUTION INTRAVENOUS at 08:05

## 2023-08-11 ENCOUNTER — HOSPITAL ENCOUNTER (OUTPATIENT)
Dept: NUCLEAR MEDICINE | Age: 53
End: 2023-08-11
Payer: COMMERCIAL

## 2023-08-11 ENCOUNTER — HOSPITAL ENCOUNTER (OUTPATIENT)
Age: 53
End: 2023-08-11
Payer: COMMERCIAL

## 2023-08-11 DIAGNOSIS — R07.9 CHEST PAIN, UNSPECIFIED TYPE: ICD-10-CM

## 2023-08-11 DIAGNOSIS — Z82.49 FAMILY HISTORY OF THORACIC AORTIC ANEURYSM: ICD-10-CM

## 2023-08-11 DIAGNOSIS — M32.9 LUPUS (HCC): Primary | ICD-10-CM

## 2023-08-11 DIAGNOSIS — Z82.49 FAMILY HISTORY OF MI (MYOCARDIAL INFARCTION): ICD-10-CM

## 2023-08-11 LAB
NUC STRESS EJECTION FRACTION: 64 %
STRESS BASELINE DIAS BP: 85 MMHG
STRESS BASELINE HR: 50 BPM
STRESS BASELINE SYS BP: 113 MMHG
STRESS ST DEPRESSION: 0 MM
STRESS STAGE RECOVERY 1 BP: NORMAL MMHG
STRESS STAGE RECOVERY 1 DURATION: 1 MIN:SEC
STRESS STAGE RECOVERY 1 HR: 120 BPM
STRESS STAGE RECOVERY 2 BP: NORMAL MMHG
STRESS STAGE RECOVERY 2 DURATION: 7 MIN:SEC
STRESS STAGE RECOVERY 2 HR: 73 BPM
STRESS TARGET HR: 167 BPM
STRESS TARGET HR: 167 BPM
TID: 0.95

## 2023-08-11 PROCEDURE — 93017 CV STRESS TEST TRACING ONLY: CPT

## 2023-08-11 PROCEDURE — A9500 TC99M SESTAMIBI: HCPCS | Performed by: FAMILY MEDICINE

## 2023-08-11 PROCEDURE — 2580000003 HC RX 258: Performed by: FAMILY MEDICINE

## 2023-08-11 PROCEDURE — 78452 HT MUSCLE IMAGE SPECT MULT: CPT

## 2023-08-11 PROCEDURE — 3430000000 HC RX DIAGNOSTIC RADIOPHARMACEUTICAL: Performed by: FAMILY MEDICINE

## 2023-08-11 RX ORDER — TETRAKIS(2-METHOXYISOBUTYLISOCYANIDE)COPPER(I) TETRAFLUOROBORATE 1 MG/ML
34.1 INJECTION, POWDER, LYOPHILIZED, FOR SOLUTION INTRAVENOUS
Status: COMPLETED | OUTPATIENT
Start: 2023-08-11 | End: 2023-08-11

## 2023-08-11 RX ORDER — NITROGLYCERIN 0.4 MG/1
0.4 TABLET SUBLINGUAL EVERY 5 MIN PRN
Status: ACTIVE | OUTPATIENT
Start: 2023-08-11 | End: 2023-08-11

## 2023-08-11 RX ORDER — METOPROLOL TARTRATE 5 MG/5ML
5 INJECTION INTRAVENOUS EVERY 5 MIN PRN
Status: ACTIVE | OUTPATIENT
Start: 2023-08-11 | End: 2023-08-11

## 2023-08-11 RX ORDER — ATROPINE SULFATE 0.1 MG/ML
0.5 INJECTION INTRAVENOUS EVERY 5 MIN PRN
Status: ACTIVE | OUTPATIENT
Start: 2023-08-11 | End: 2023-08-11

## 2023-08-11 RX ORDER — SODIUM CHLORIDE 0.9 % (FLUSH) 0.9 %
10 SYRINGE (ML) INJECTION PRN
Status: DISCONTINUED | OUTPATIENT
Start: 2023-08-11 | End: 2023-08-14 | Stop reason: HOSPADM

## 2023-08-11 RX ORDER — ALBUTEROL SULFATE 90 UG/1
2 AEROSOL, METERED RESPIRATORY (INHALATION) PRN
Status: ACTIVE | OUTPATIENT
Start: 2023-08-11 | End: 2023-08-11

## 2023-08-11 RX ORDER — SODIUM CHLORIDE 9 MG/ML
500 INJECTION, SOLUTION INTRAVENOUS CONTINUOUS PRN
Status: ACTIVE | OUTPATIENT
Start: 2023-08-11 | End: 2023-08-11

## 2023-08-11 RX ORDER — SODIUM CHLORIDE 0.9 % (FLUSH) 0.9 %
5-40 SYRINGE (ML) INJECTION PRN
Status: ACTIVE | OUTPATIENT
Start: 2023-08-11 | End: 2023-08-11

## 2023-08-11 RX ORDER — TETRAKIS(2-METHOXYISOBUTYLISOCYANIDE)COPPER(I) TETRAFLUOROBORATE 1 MG/ML
10 INJECTION, POWDER, LYOPHILIZED, FOR SOLUTION INTRAVENOUS
Status: COMPLETED | OUTPATIENT
Start: 2023-08-11 | End: 2023-08-11

## 2023-08-11 RX ADMIN — SODIUM CHLORIDE, PRESERVATIVE FREE 10 ML: 5 INJECTION INTRAVENOUS at 08:26

## 2023-08-11 RX ADMIN — Medication 10.5 MILLICURIE: at 08:26

## 2023-08-11 RX ADMIN — Medication 34.1 MILLICURIE: at 10:09

## 2023-08-11 RX ADMIN — SODIUM CHLORIDE, PRESERVATIVE FREE 10 ML: 5 INJECTION INTRAVENOUS at 10:09

## 2023-08-18 LAB
STRESS BASELINE DIAS BP: 85 MMHG
STRESS BASELINE HR: 50 BPM
STRESS BASELINE SYS BP: 113 MMHG
STRESS STAGE RECOVERY 1 BP: NORMAL MMHG
STRESS STAGE RECOVERY 1 DURATION: 1 MIN:SEC
STRESS STAGE RECOVERY 1 HR: 120 BPM
STRESS STAGE RECOVERY 2 BP: NORMAL MMHG
STRESS STAGE RECOVERY 2 DURATION: 7 MIN:SEC
STRESS STAGE RECOVERY 2 HR: 73 BPM
STRESS TARGET HR: 167 BPM

## 2023-08-24 LAB
STRESS ANGINA INDEX: 0
STRESS BASELINE DIAS BP: 85 MMHG
STRESS BASELINE HR: 50 BPM
STRESS BASELINE ST DEPRESSION: 0 MM
STRESS BASELINE SYS BP: 113 MMHG
STRESS ESTIMATED WORKLOAD: 13.4 METS
STRESS EXERCISE DUR MIN: 12 MIN
STRESS PEAK DIAS BP: 80 MMHG
STRESS PEAK SYS BP: 147 MMHG
STRESS PERCENT HR ACHIEVED: 90 %
STRESS POST PEAK HR: 150 BPM
STRESS RATE PRESSURE PRODUCT: NORMAL BPM*MMHG
STRESS SR DUKE TREADMILL SCORE: 12
STRESS ST DEPRESSION: 0 MM
STRESS STAGE RECOVERY 1 BP: NORMAL MMHG
STRESS STAGE RECOVERY 1 DURATION: 1 MIN:SEC
STRESS STAGE RECOVERY 1 HR: 120 BPM
STRESS STAGE RECOVERY 2 BP: NORMAL MMHG
STRESS STAGE RECOVERY 2 DURATION: 7 MIN:SEC
STRESS STAGE RECOVERY 2 HR: 73 BPM
STRESS TARGET HR: 167 BPM

## 2023-08-28 PROBLEM — D48.9 NEOPLASM OF UNCERTAIN BEHAVIOR: Status: ACTIVE | Noted: 2023-08-28

## 2023-08-31 ENCOUNTER — HOSPITAL ENCOUNTER (OUTPATIENT)
Age: 53
Discharge: HOME | End: 2023-09-02
Payer: COMMERCIAL

## 2023-08-31 VITALS
SYSTOLIC BLOOD PRESSURE: 130 MMHG | HEIGHT: 67 IN | HEART RATE: 58 BPM | DIASTOLIC BLOOD PRESSURE: 78 MMHG | WEIGHT: 152 LBS | BODY MASS INDEX: 23.86 KG/M2

## 2023-08-31 DIAGNOSIS — Z82.49 FAMILY HISTORY OF THORACIC AORTIC ANEURYSM: ICD-10-CM

## 2023-08-31 DIAGNOSIS — R07.9 CHEST PAIN, UNSPECIFIED TYPE: ICD-10-CM

## 2023-08-31 DIAGNOSIS — Z82.49 FAMILY HISTORY OF MI (MYOCARDIAL INFARCTION): ICD-10-CM

## 2023-08-31 LAB
ECHO AO ROOT DIAM: 2.8 CM
ECHO AO ROOT INDEX: 1.56 CM/M2
ECHO AV AREA PEAK VELOCITY: 2.6 CM2
ECHO AV AREA VTI: 2.8 CM2
ECHO AV AREA/BSA PEAK VELOCITY: 1.4 CM2/M2
ECHO AV AREA/BSA VTI: 1.6 CM2/M2
ECHO AV MEAN GRADIENT: 5 MMHG
ECHO AV MEAN VELOCITY: 1.1 M/S
ECHO AV PEAK GRADIENT: 9 MMHG
ECHO AV PEAK VELOCITY: 1.5 M/S
ECHO AV VELOCITY RATIO: 0.8
ECHO AV VTI: 32.2 CM
ECHO BSA: 1.81 M2
ECHO EST RA PRESSURE: 3 MMHG
ECHO LA AREA 2C: 13.2 CM2
ECHO LA AREA 4C: 13.6 CM2
ECHO LA DIAMETER INDEX: 1.83 CM/M2
ECHO LA DIAMETER: 3.3 CM
ECHO LA MAJOR AXIS: 4.9 CM
ECHO LA MINOR AXIS: 4.7 CM
ECHO LA TO AORTIC ROOT RATIO: 1.18
ECHO LA VOL 2C: 30 ML (ref 22–52)
ECHO LA VOL 4C: 28 ML (ref 22–52)
ECHO LA VOL BP: 29 ML (ref 22–52)
ECHO LA VOL/BSA BIPLANE: 16 ML/M2 (ref 16–34)
ECHO LA VOLUME INDEX A2C: 17 ML/M2 (ref 16–34)
ECHO LA VOLUME INDEX A4C: 16 ML/M2 (ref 16–34)
ECHO LV E' LATERAL VELOCITY: 12 CM/S
ECHO LV E' SEPTAL VELOCITY: 8 CM/S
ECHO LV FRACTIONAL SHORTENING: 31 % (ref 28–44)
ECHO LV INTERNAL DIMENSION DIASTOLE INDEX: 2.17 CM/M2
ECHO LV INTERNAL DIMENSION DIASTOLIC: 3.9 CM (ref 3.9–5.3)
ECHO LV INTERNAL DIMENSION SYSTOLIC INDEX: 1.5 CM/M2
ECHO LV INTERNAL DIMENSION SYSTOLIC: 2.7 CM
ECHO LV IVSD: 1.2 CM (ref 0.6–0.9)
ECHO LV MASS 2D: 149.5 G (ref 67–162)
ECHO LV MASS INDEX 2D: 83.1 G/M2 (ref 43–95)
ECHO LV POSTERIOR WALL DIASTOLIC: 1.1 CM (ref 0.6–0.9)
ECHO LV RELATIVE WALL THICKNESS RATIO: 0.56
ECHO LVOT AREA: 3.1 CM2
ECHO LVOT AV VTI INDEX: 0.88
ECHO LVOT DIAM: 2 CM
ECHO LVOT MEAN GRADIENT: 3 MMHG
ECHO LVOT PEAK GRADIENT: 6 MMHG
ECHO LVOT PEAK VELOCITY: 1.2 M/S
ECHO LVOT STROKE VOLUME INDEX: 49.4 ML/M2
ECHO LVOT SV: 88.9 ML
ECHO LVOT VTI: 28.3 CM
ECHO MV A VELOCITY: 0.54 M/S
ECHO MV AREA VTI: 2.4 CM2
ECHO MV E DECELERATION TIME (DT): 176 MS
ECHO MV E VELOCITY: 1.13 M/S
ECHO MV E/A RATIO: 2.09
ECHO MV E/E' LATERAL: 9.42
ECHO MV E/E' RATIO (AVERAGED): 11.77
ECHO MV E/E' SEPTAL: 14.13
ECHO MV LVOT VTI INDEX: 1.3
ECHO MV MAX VELOCITY: 1.2 M/S
ECHO MV MEAN GRADIENT: 1 MMHG
ECHO MV MEAN VELOCITY: 0.5 M/S
ECHO MV PEAK GRADIENT: 6 MMHG
ECHO MV VTI: 36.9 CM
ECHO RIGHT VENTRICULAR SYSTOLIC PRESSURE (RVSP): 23 MMHG
ECHO RV TAPSE: 2.4 CM (ref 1.7–?)
ECHO TV REGURGITANT MAX VELOCITY: 2.25 M/S
ECHO TV REGURGITANT PEAK GRADIENT: 18 MMHG

## 2023-08-31 PROCEDURE — 93306 TTE W/DOPPLER COMPLETE: CPT

## 2023-09-25 RX ORDER — ATORVASTATIN CALCIUM 20 MG/1
20 TABLET, FILM COATED ORAL DAILY
Qty: 30 TABLET | Refills: 2 | Status: SHIPPED | OUTPATIENT
Start: 2023-09-25

## 2023-09-25 NOTE — TELEPHONE ENCOUNTER
Franky Gitelman is calling to request a refill on the following medication(s):    Last Visit Date (If Applicable):  8/56/0934    Next Visit Date:    Visit date not found    Medication Request:  Requested Prescriptions     Pending Prescriptions Disp Refills    atorvastatin (LIPITOR) 20 MG tablet [Pharmacy Med Name: ATORVASTATIN CALCIUM 20MG TABS] 30 tablet 2     Sig: TAKE ONE TABLET BY MOUTH ONCE A DAY

## 2023-11-02 NOTE — PROGRESS NOTES
PAT phone call for local procedure completed with pt. Date/time/location (entrance C) of surgery/procedure verified with pt. Instructed pt to take a shower the AM of surgery/procedure and to avoid lotions, creams, jewelry. Encouraged pt to avoid artificial nails and/or nailpolish. Instructed pt to take BP meds with a small sip of water prior to procedure/surgery.

## 2023-11-06 ENCOUNTER — HOSPITAL ENCOUNTER (OUTPATIENT)
Age: 53
Setting detail: OUTPATIENT SURGERY
Discharge: HOME | End: 2023-11-06
Attending: PLASTIC SURGERY | Admitting: PLASTIC SURGERY
Payer: COMMERCIAL

## 2023-11-06 VITALS
DIASTOLIC BLOOD PRESSURE: 84 MMHG | HEIGHT: 67 IN | TEMPERATURE: 96 F | RESPIRATION RATE: 13 BRPM | BODY MASS INDEX: 24.58 KG/M2 | SYSTOLIC BLOOD PRESSURE: 126 MMHG | HEART RATE: 48 BPM | WEIGHT: 156.6 LBS | OXYGEN SATURATION: 97 %

## 2023-11-06 DIAGNOSIS — D48.9 NEOPLASM OF UNCERTAIN BEHAVIOR: ICD-10-CM

## 2023-11-06 PROCEDURE — 7100000010 HC PHASE II RECOVERY - FIRST 15 MIN: Performed by: PLASTIC SURGERY

## 2023-11-06 PROCEDURE — 88305 TISSUE EXAM BY PATHOLOGIST: CPT

## 2023-11-06 PROCEDURE — 3600000012 HC SURGERY LEVEL 2 ADDTL 15MIN: Performed by: PLASTIC SURGERY

## 2023-11-06 PROCEDURE — 2709999900 HC NON-CHARGEABLE SUPPLY: Performed by: PLASTIC SURGERY

## 2023-11-06 PROCEDURE — 2500000003 HC RX 250 WO HCPCS: Performed by: PLASTIC SURGERY

## 2023-11-06 PROCEDURE — 3600000002 HC SURGERY LEVEL 2 BASE: Performed by: PLASTIC SURGERY

## 2023-11-06 RX ORDER — BUPIVACAINE HYDROCHLORIDE AND EPINEPHRINE 2.5; 5 MG/ML; UG/ML
INJECTION, SOLUTION EPIDURAL; INFILTRATION; INTRACAUDAL; PERINEURAL PRN
Status: DISCONTINUED | OUTPATIENT
Start: 2023-11-06 | End: 2023-11-06 | Stop reason: ALTCHOICE

## 2023-11-06 RX ORDER — GINSENG 100 MG
CAPSULE ORAL PRN
Status: DISCONTINUED | OUTPATIENT
Start: 2023-11-06 | End: 2023-11-06 | Stop reason: ALTCHOICE

## 2023-11-06 ASSESSMENT — PAIN - FUNCTIONAL ASSESSMENT
PAIN_FUNCTIONAL_ASSESSMENT: ACTIVITIES ARE NOT PREVENTED
PAIN_FUNCTIONAL_ASSESSMENT: 0-10

## 2023-11-06 NOTE — OP NOTE
Operative Note      Patient: Sandee Peres  YOB: 1970  MRN: [id number]    Date of Procedure: 11/6/2023    Pre-Op Diagnosis Codes: * Neoplasm of uncertain behavior [F94.1]    Post-Op Diagnosis: Same       Procedure(s):  LEFT ARM LESION EXCISION X 1  BACK LESION EXCISION X 3    1. Excision of irregular shaped pigmented lesion left upper arm, excised diameter 1.3 cm closed with a 1.5 cm multilayer closure. 2.  Excision of irregularly shaped pigmented lesion upper back left, excised diameter 0.6 cm closed with a 0.8 cm multilayer closure  3. Excision of irregularly shaped pigmented lesion upper back middle, excised diameter 0.7 cm closed with a 1 cm multilayer closure  4. Excision of irregular shaped pigmented lesion upper back right, excised diameter 0.9 cm closed with a 1.2 cm multilayer closure. Surgeon(s):  Babatunde Reza MD    Assistant:   * No surgical staff found *    Anesthesia: Local    Estimated Blood Loss (mL): less than 50     Complications: None    Specimens:   ID Type Source Tests Collected by Time Destination   A : left upper arm Tissue Tissue SURGICAL PATHOLOGY Shyam Hassan MD 11/6/2023 1130    B : upper back left Tissue Tissue SURGICAL PATHOLOGY Shyam Hassan MD 11/6/2023 1132    C : upper back middle Tissue Tissue SURGICAL PATHOLOGY Shyam Hassan MD 11/6/2023 1133    D : upper back right Tissue Tissue SURGICAL PATHOLOGY Shyam Hassan MD 11/6/2023 1134        Implants:  * No implants in log *      Drains: * No LDAs found *    Findings: Good tissue approximation    This procedure was not performed to treat primary cutaneous melanoma through wide local excision      Detailed Description of Procedure: The patient was brought into the operating room and laid in the prone position. Her left upper arm and back were prepped and draped in sterile fashion.   0.25% Marcaine with epinephrine was injected into the tissue surrounding each of

## 2023-11-06 NOTE — H&P
Office Note     Sheri Ocampo, NITHYA-NAIDA     Subjective:      Patient ID: Luda Thompson is a 47 y.o. female. HPI     Patient comes in today for a 6 month skin check. She has a history of melanoma and multiple dysplastic nevi on her body. She was last seen about 6 months ago and is here for close follow up. No new concerns at this time. She has numerous irregular bordered and hyper pigmented areas located on her arms, legs and back. Review of Systems     Past Medical History        Past Medical History:   Diagnosis Date    History of gastroesophageal reflux (GERD)       very mild and intermittent    History of stress test 2014     WNL    Kidney stones 2010 7 2016     X 2 PASSED ON OWN    LA (lupus anticoagulant) disorder ([address]) 1998     Pt also has Anti-RO (SSA-SSB)    Melanoma ([address]) 09/2018     RT LOWER BACK    Melanoma ([address])      Rheumatoid arthritis(714.0) 1996     HAS ANTIBDIES BUT NO SYMPTOMS    Skin lesion of foot       UNDER NAIL LEFT GREAT TOE    Wears glasses       READING         Past Surgical History         Past Surgical History:   Procedure Laterality Date    ABDOMEN SURGERY   1998, 2001     C SECTION    ABDOMEN SURGERY   07/2007     JOSHUA WITH RSO    ABDOMEN SURGERY   07/2008     LAPAROTOMY WITH EVIE  AND LSO    [address] [place] [place] [address], 2001    FOOT SURGERY Left 4/21/2020     BIOPSY LEFT BIG TOE performed by Vicente Sahu MD at [facility] ([address] [place] [address])   7/2007     Dr. Jerad Barton -[address] [place] [address]   7/25/08     Álvarez-EVIE and LSO     LEG BIOPSY EXCISION Right 11/21/2019     LEG LESION BIOPSY EXCISION - THIGH RIGHT/FOOT LESION BIOPSY EXCISION - SECOND TOE LEFT performed by Vicente Sahu MD at [facility] Left 04/21/2020     Biopsy Left Big Toe    VA EXCISION TUMOR SOFT TISSUE BACK/FLANK SUBQ <3CM N/A 6/10/2019     Excision and biopsy upper abdomen, back and right medial thigh. LEFT MEDIAL THIGH performed by Yash Epstein MD Sonya at [facility] OFFICE/OUTPT VISIT,PROCEDURE ONLY N/A 10/9/2018     EXCISION SKIN LESION MID LOWER BACK, RIGHT POST. AURICULAR performed by Cielo Cheung MD at [facility] / [address]   06/10/2019     upper abdomen, upper back, Rt medial thigh, Left medial knee    PRE-MALIGNANT / BENIGN SKIN LESION EXCISION   10/06/2020     BACK LESION BIOPSY EXCISION - IRREGULAR PIGMENTED LESION X 4    PRE-MALIGNANT / BENIGN SKIN LESION EXCISION   06/21/2021      IRREGULAR PIGMENTED  LESION BIOPSY EXCISION X 4 BACK (    PRE-MALIGNANT / BENIGN SKIN LESION EXCISION N/A 01/18/2022      LEFT HIP, LEFT AXILLARY, RIGHT POPLITEAL, RIGHT LOWER BACK X2, AND DORSUM LEFT FOOT  LESION BIOPSY EXCISIONS (N/A )    SKIN BIOPSY N/A 10/6/2020     BACK LESION BIOPSY EXCISION - IRREGULAR PIGMENTED LESION X 4 performed by Cielo Cheung MD at [address] 6/21/2021     IRREGULAR PIGMENTED  LESION BIOPSY EXCISION X 4 BACK performed by Cielo Cheung MD at [address] 1/18/2022     LEFT HIP, LEFT AXILLARY, RIGHT POPLITEAL, RIGHT LOWER BACK X2, AND DORSUM LEFT FOOT  LESION BIOPSY EXCISIONS performed by Cielo Cheung MD at [facility] N/A 10/09/2018     mid lower back and right post auricular         No Known Allergies  Current Facility-Administered Medications          Current Outpatient Medications   Medication Sig Dispense Refill    estradiol (ESTRACE) 1 MG tablet TAKE 1 TABLET BY MOUTH ONE TIME A DAY 90 tablet 3    zolpidem (AMBIEN) 10 MG tablet Take 1 tablet by mouth nightly as needed for Sleep for up to 30 days. 30 tablet 0    ALPRAZolam (XANAX) 0.25 MG tablet Take 1 tablet by mouth nightly as needed for Sleep for up to 30 days.  30 tablet 0    rosuvastatin (CRESTOR) 40 MG tablet Take 1 tablet by mouth daily (Patient not taking: Reported on 7/31/2023) 90 tablet 1    atorvastatin

## 2023-11-10 LAB — SURGICAL PATHOLOGY REPORT: NORMAL

## 2023-11-29 ENCOUNTER — HOSPITAL ENCOUNTER (OUTPATIENT)
Dept: MAMMOGRAPHY | Age: 53
Discharge: HOME | End: 2023-12-01
Payer: COMMERCIAL

## 2023-11-29 DIAGNOSIS — Z12.31 ENCOUNTER FOR SCREENING MAMMOGRAM FOR BREAST CANCER: ICD-10-CM

## 2023-11-29 PROCEDURE — 77063 BREAST TOMOSYNTHESIS BI: CPT

## 2023-12-01 DIAGNOSIS — R92.8 ABNORMAL MAMMOGRAM: Primary | ICD-10-CM

## 2023-12-26 ENCOUNTER — HOSPITAL ENCOUNTER (OUTPATIENT)
Dept: ULTRASOUND IMAGING | Age: 53
Discharge: HOME | End: 2023-12-28
Payer: COMMERCIAL

## 2023-12-26 ENCOUNTER — HOSPITAL ENCOUNTER (OUTPATIENT)
Dept: MAMMOGRAPHY | Age: 53
Discharge: HOME | End: 2023-12-28
Payer: COMMERCIAL

## 2023-12-26 DIAGNOSIS — R92.8 ABNORMAL MAMMOGRAM: ICD-10-CM

## 2023-12-26 PROCEDURE — G0279 TOMOSYNTHESIS, MAMMO: HCPCS

## 2023-12-26 PROCEDURE — 76642 ULTRASOUND BREAST LIMITED: CPT

## 2023-12-27 ENCOUNTER — OFFICE VISIT (OUTPATIENT)
Dept: FAMILY MEDICINE CLINIC | Age: 53
End: 2023-12-27
Payer: COMMERCIAL

## 2023-12-27 ENCOUNTER — TELEPHONE (OUTPATIENT)
Dept: FAMILY MEDICINE CLINIC | Age: 53
End: 2023-12-27

## 2023-12-27 VITALS
OXYGEN SATURATION: 97 % | DIASTOLIC BLOOD PRESSURE: 82 MMHG | WEIGHT: 152 LBS | HEIGHT: 67 IN | RESPIRATION RATE: 16 BRPM | TEMPERATURE: 97.8 F | HEART RATE: 56 BPM | BODY MASS INDEX: 23.86 KG/M2 | SYSTOLIC BLOOD PRESSURE: 126 MMHG

## 2023-12-27 DIAGNOSIS — F45.8 BRUXISM: ICD-10-CM

## 2023-12-27 DIAGNOSIS — J02.9 ACUTE PHARYNGITIS, UNSPECIFIED ETIOLOGY: Primary | ICD-10-CM

## 2023-12-27 LAB — S PYO AG THROAT QL: NORMAL

## 2023-12-27 PROCEDURE — 99214 OFFICE O/P EST MOD 30 MIN: CPT | Performed by: FAMILY MEDICINE

## 2023-12-27 PROCEDURE — 87880 STREP A ASSAY W/OPTIC: CPT | Performed by: FAMILY MEDICINE

## 2023-12-27 RX ORDER — HYDROCODONE BITARTRATE AND ACETAMINOPHEN 5; 325 MG/1; MG/1
1 TABLET ORAL EVERY 8 HOURS PRN
Qty: 9 TABLET | Refills: 0 | Status: SHIPPED | OUTPATIENT
Start: 2023-12-27 | End: 2023-12-30

## 2023-12-27 RX ORDER — AMOXICILLIN AND CLAVULANATE POTASSIUM 875; 125 MG/1; MG/1
1 TABLET, FILM COATED ORAL 2 TIMES DAILY
Qty: 20 TABLET | Refills: 0 | Status: SHIPPED | OUTPATIENT
Start: 2023-12-27 | End: 2024-01-06

## 2023-12-27 RX ORDER — PREDNISONE 20 MG/1
TABLET ORAL
Qty: 12 TABLET | Refills: 0 | Status: SHIPPED | OUTPATIENT
Start: 2023-12-27

## 2023-12-27 NOTE — TELEPHONE ENCOUNTER
Patient called wants to get swabbed for strep throat, throat is swollen, sore, red, no fever, hard to swallow, white patches on the back of the throat. Duration 2 days over all feels crappy. Please advise.

## 2023-12-27 NOTE — PATIENT INSTRUCTIONS
New Updates for My [facility] EVIE    Thank you for choosing [facility] to give you the best care! [facility] ([facility]) is always trying to think of new ways to help their patients. We are asking all patients to try out the new digital registration that is now available through the [employer] Down load today! . Via the evie you're now able to update your personal and registration information prior to your upcoming appointment. This will save you time once you arrive at the office to check-in, not to mention your information remains safe!! Many other perks come from signing up for an account, such as:  Requesting refills  Scheduling an appointment  Completing an E-Visit  Sending a message to the office/provider  Having access to your medication list  Paying your bill/copay prior to your appointment  Scheduling your yearly mammogram  Review your test results    If you are not familiar the [facility] EVIE, please ask one of us and we will be happy to answer any questions or help you set-up your account.

## 2024-01-26 RX ORDER — ATORVASTATIN CALCIUM 20 MG/1
20 TABLET, FILM COATED ORAL DAILY
Qty: 30 TABLET | Refills: 2 | Status: SHIPPED | OUTPATIENT
Start: 2024-01-26

## 2024-06-03 RX ORDER — ATORVASTATIN CALCIUM 20 MG/1
20 TABLET, FILM COATED ORAL DAILY
Qty: 30 TABLET | Refills: 2 | Status: SHIPPED | OUTPATIENT
Start: 2024-06-03

## 2024-09-18 ASSESSMENT — PATIENT HEALTH QUESTIONNAIRE - PHQ9
1. LITTLE INTEREST OR PLEASURE IN DOING THINGS: NOT AT ALL
SUM OF ALL RESPONSES TO PHQ QUESTIONS 1-9: 1
2. FEELING DOWN, DEPRESSED OR HOPELESS: SEVERAL DAYS
SUM OF ALL RESPONSES TO PHQ9 QUESTIONS 1 & 2: 1
1. LITTLE INTEREST OR PLEASURE IN DOING THINGS: NOT AT ALL
SUM OF ALL RESPONSES TO PHQ9 QUESTIONS 1 & 2: 1
2. FEELING DOWN, DEPRESSED OR HOPELESS: SEVERAL DAYS

## 2024-09-20 ENCOUNTER — OFFICE VISIT (OUTPATIENT)
Dept: FAMILY MEDICINE CLINIC | Age: 54
End: 2024-09-20
Payer: COMMERCIAL

## 2024-09-20 VITALS
HEIGHT: 67 IN | SYSTOLIC BLOOD PRESSURE: 118 MMHG | WEIGHT: 153.4 LBS | DIASTOLIC BLOOD PRESSURE: 72 MMHG | BODY MASS INDEX: 24.08 KG/M2 | HEART RATE: 55 BPM | OXYGEN SATURATION: 99 %

## 2024-09-20 DIAGNOSIS — F41.9 ANXIETY: ICD-10-CM

## 2024-09-20 DIAGNOSIS — Z82.49 FAMILY HISTORY OF MI (MYOCARDIAL INFARCTION): ICD-10-CM

## 2024-09-20 DIAGNOSIS — N95.1 VAGINAL DRYNESS, MENOPAUSAL: ICD-10-CM

## 2024-09-20 DIAGNOSIS — F51.01 PRIMARY INSOMNIA: ICD-10-CM

## 2024-09-20 DIAGNOSIS — Z82.49 FAMILY HISTORY OF THORACIC AORTIC ANEURYSM: ICD-10-CM

## 2024-09-20 DIAGNOSIS — Z12.31 SCREENING MAMMOGRAM FOR BREAST CANCER: ICD-10-CM

## 2024-09-20 DIAGNOSIS — Z00.00 WELL ADULT EXAM: Primary | ICD-10-CM

## 2024-09-20 PROCEDURE — 99396 PREV VISIT EST AGE 40-64: CPT | Performed by: FAMILY MEDICINE

## 2024-09-20 RX ORDER — ZOLPIDEM TARTRATE 10 MG/1
10 TABLET ORAL NIGHTLY PRN
Qty: 30 TABLET | Refills: 0 | Status: SHIPPED | OUTPATIENT
Start: 2024-09-20 | End: 2024-10-20

## 2024-09-20 RX ORDER — ALPRAZOLAM 0.25 MG
0.25 TABLET ORAL NIGHTLY PRN
Qty: 30 TABLET | Refills: 0 | Status: SHIPPED | OUTPATIENT
Start: 2024-09-20 | End: 2024-10-20

## 2024-09-20 RX ORDER — ATORVASTATIN CALCIUM 20 MG/1
20 TABLET, FILM COATED ORAL DAILY
Qty: 90 TABLET | Refills: 3 | Status: SHIPPED | OUTPATIENT
Start: 2024-09-20

## 2024-09-20 RX ORDER — ESTRADIOL 1 MG/1
TABLET ORAL
Qty: 90 TABLET | Refills: 3 | Status: SHIPPED | OUTPATIENT
Start: 2024-09-20

## 2024-09-20 SDOH — ECONOMIC STABILITY: INCOME INSECURITY: HOW HARD IS IT FOR YOU TO PAY FOR THE VERY BASICS LIKE FOOD, HOUSING, MEDICAL CARE, AND HEATING?: NOT HARD AT ALL

## 2024-09-20 SDOH — ECONOMIC STABILITY: FOOD INSECURITY: WITHIN THE PAST 12 MONTHS, YOU WORRIED THAT YOUR FOOD WOULD RUN OUT BEFORE YOU GOT MONEY TO BUY MORE.: NEVER TRUE

## 2024-09-20 SDOH — ECONOMIC STABILITY: FOOD INSECURITY: WITHIN THE PAST 12 MONTHS, THE FOOD YOU BOUGHT JUST DIDN'T LAST AND YOU DIDN'T HAVE MONEY TO GET MORE.: NEVER TRUE

## 2024-09-24 ENCOUNTER — HOSPITAL ENCOUNTER (OUTPATIENT)
Age: 54
Discharge: HOME | End: 2024-09-24
Payer: COMMERCIAL

## 2024-09-24 DIAGNOSIS — F41.9 ANXIETY: ICD-10-CM

## 2024-09-24 DIAGNOSIS — Z82.49 FAMILY HISTORY OF THORACIC AORTIC ANEURYSM: ICD-10-CM

## 2024-09-24 DIAGNOSIS — Z82.49 FAMILY HISTORY OF MI (MYOCARDIAL INFARCTION): ICD-10-CM

## 2024-09-24 DIAGNOSIS — Z00.00 WELL ADULT EXAM: ICD-10-CM

## 2024-09-24 LAB
25(OH)D3 SERPL-MCNC: 46.9 NG/ML (ref 30–100)
ALBUMIN SERPL-MCNC: 4.8 G/DL (ref 3.5–5.2)
ALBUMIN/GLOB SERPL: 2 {RATIO} (ref 1–2.5)
ALP SERPL-CCNC: 66 U/L (ref 35–104)
ALT SERPL-CCNC: 9 U/L (ref 10–35)
ANION GAP SERPL CALCULATED.3IONS-SCNC: 10 MMOL/L (ref 9–16)
AST SERPL-CCNC: 22 U/L (ref 10–35)
BASOPHILS # BLD: 0.03 K/UL (ref 0–0.2)
BASOPHILS NFR BLD: 1 % (ref 0–2)
BILIRUB SERPL-MCNC: 0.5 MG/DL (ref 0–1.2)
BUN SERPL-MCNC: 13 MG/DL (ref 6–20)
CALCIUM SERPL-MCNC: 9.1 MG/DL (ref 8.6–10.4)
CHLORIDE SERPL-SCNC: 105 MMOL/L (ref 98–107)
CHOLEST SERPL-MCNC: 197 MG/DL (ref 0–199)
CHOLESTEROL/HDL RATIO: 4
CO2 SERPL-SCNC: 26 MMOL/L (ref 20–31)
CREAT SERPL-MCNC: 0.8 MG/DL (ref 0.5–0.9)
EOSINOPHIL # BLD: 0.12 K/UL (ref 0–0.44)
EOSINOPHILS RELATIVE PERCENT: 2 % (ref 1–4)
ERYTHROCYTE [DISTWIDTH] IN BLOOD BY AUTOMATED COUNT: 12.1 % (ref 11.8–14.4)
GFR, ESTIMATED: 88 ML/MIN/1.73M2
GLUCOSE SERPL-MCNC: 97 MG/DL (ref 74–99)
HCT VFR BLD AUTO: 42 % (ref 36.3–47.1)
HDLC SERPL-MCNC: 53 MG/DL
HGB BLD-MCNC: 13.8 G/DL (ref 11.9–15.1)
IMM GRANULOCYTES # BLD AUTO: <0.03 K/UL (ref 0–0.3)
IMM GRANULOCYTES NFR BLD: 0 %
LDLC SERPL CALC-MCNC: 106 MG/DL (ref 0–100)
LYMPHOCYTES NFR BLD: 2.39 K/UL (ref 1.1–3.7)
LYMPHOCYTES RELATIVE PERCENT: 38 % (ref 24–43)
MCH RBC QN AUTO: 29.9 PG (ref 25.2–33.5)
MCHC RBC AUTO-ENTMCNC: 32.9 G/DL (ref 28.4–34.8)
MCV RBC AUTO: 91.1 FL (ref 82.6–102.9)
MONOCYTES NFR BLD: 0.58 K/UL (ref 0.1–1.2)
MONOCYTES NFR BLD: 9 % (ref 3–12)
NEUTROPHILS NFR BLD: 50 % (ref 36–65)
NEUTS SEG NFR BLD: 3.19 K/UL (ref 1.5–8.1)
NRBC BLD-RTO: 0 PER 100 WBC
PLATELET # BLD AUTO: 255 K/UL (ref 138–453)
PMV BLD AUTO: 10.1 FL (ref 8.1–13.5)
POTASSIUM SERPL-SCNC: 4.1 MMOL/L (ref 3.7–5.3)
PROT SERPL-MCNC: 7.3 G/DL (ref 6.6–8.7)
RBC # BLD AUTO: 4.61 M/UL (ref 3.95–5.11)
SODIUM SERPL-SCNC: 141 MMOL/L (ref 136–145)
T4 FREE SERPL-MCNC: 1.1 NG/DL (ref 0.92–1.68)
TRIGL SERPL-MCNC: 192 MG/DL
TSH SERPL DL<=0.05 MIU/L-ACNC: 2.47 UIU/ML (ref 0.27–4.2)
VLDLC SERPL CALC-MCNC: 38 MG/DL
WBC OTHER # BLD: 6.3 K/UL (ref 3.5–11.3)

## 2024-09-24 PROCEDURE — 84439 ASSAY OF FREE THYROXINE: CPT

## 2024-09-24 PROCEDURE — 36415 COLL VENOUS BLD VENIPUNCTURE: CPT

## 2024-09-24 PROCEDURE — 82306 VITAMIN D 25 HYDROXY: CPT

## 2024-09-24 PROCEDURE — 80053 COMPREHEN METABOLIC PANEL: CPT

## 2024-09-24 PROCEDURE — 84443 ASSAY THYROID STIM HORMONE: CPT

## 2024-09-24 PROCEDURE — 80061 LIPID PANEL: CPT

## 2024-09-24 PROCEDURE — 85025 COMPLETE CBC W/AUTO DIFF WBC: CPT

## 2025-03-18 ENCOUNTER — HOSPITAL ENCOUNTER (OUTPATIENT)
Dept: MAMMOGRAPHY | Age: 55
Discharge: HOME | End: 2025-03-20
Attending: FAMILY MEDICINE
Payer: COMMERCIAL

## 2025-03-18 DIAGNOSIS — Z12.31 SCREENING MAMMOGRAM FOR BREAST CANCER: ICD-10-CM

## 2025-03-18 PROCEDURE — 77067 SCR MAMMO BI INCL CAD: CPT

## 2025-03-24 ENCOUNTER — RESULTS FOLLOW-UP (OUTPATIENT)
Dept: MAMMOGRAPHY | Age: 55
End: 2025-03-24

## 2025-04-29 DIAGNOSIS — F51.01 PRIMARY INSOMNIA: ICD-10-CM

## 2025-04-29 RX ORDER — ZOLPIDEM TARTRATE 10 MG/1
10 TABLET ORAL NIGHTLY PRN
Qty: 30 TABLET | Refills: 0 | Status: SHIPPED | OUTPATIENT
Start: 2025-04-29 | End: 2025-05-29

## 2025-04-29 NOTE — TELEPHONE ENCOUNTER
Clarita Webber is calling to request a refill on the following medication(s):    Last Visit Date (If Applicable):  9/20/2024    Next Visit Date:    5/12/2025    Medication Request:  Requested Prescriptions     Pending Prescriptions Disp Refills    zolpidem (AMBIEN) 10 MG tablet 30 tablet 0     Sig: Take 1 tablet by mouth nightly as needed for Sleep for up to 30 days.

## 2025-05-12 ENCOUNTER — OFFICE VISIT (OUTPATIENT)
Dept: FAMILY MEDICINE CLINIC | Age: 55
End: 2025-05-12
Payer: COMMERCIAL

## 2025-05-12 VITALS
BODY MASS INDEX: 24.33 KG/M2 | HEART RATE: 58 BPM | HEIGHT: 67 IN | DIASTOLIC BLOOD PRESSURE: 74 MMHG | WEIGHT: 155 LBS | SYSTOLIC BLOOD PRESSURE: 134 MMHG | OXYGEN SATURATION: 97 %

## 2025-05-12 DIAGNOSIS — F51.01 PRIMARY INSOMNIA: ICD-10-CM

## 2025-05-12 PROCEDURE — 99213 OFFICE O/P EST LOW 20 MIN: CPT | Performed by: FAMILY MEDICINE

## 2025-05-12 RX ORDER — ZOLPIDEM TARTRATE 10 MG/1
10 TABLET ORAL NIGHTLY PRN
Qty: 90 TABLET | Refills: 0 | Status: SHIPPED | OUTPATIENT
Start: 2025-05-12 | End: 2025-06-11

## 2025-05-12 SDOH — ECONOMIC STABILITY: INCOME INSECURITY: IN THE LAST 12 MONTHS, WAS THERE A TIME WHEN YOU WERE NOT ABLE TO PAY THE MORTGAGE OR RENT ON TIME?: NO

## 2025-05-12 SDOH — ECONOMIC STABILITY: FOOD INSECURITY: WITHIN THE PAST 12 MONTHS, THE FOOD YOU BOUGHT JUST DIDN'T LAST AND YOU DIDN'T HAVE MONEY TO GET MORE.: NEVER TRUE

## 2025-05-12 SDOH — ECONOMIC STABILITY: FOOD INSECURITY: WITHIN THE PAST 12 MONTHS, YOU WORRIED THAT YOUR FOOD WOULD RUN OUT BEFORE YOU GOT MONEY TO BUY MORE.: NEVER TRUE

## 2025-05-12 SDOH — ECONOMIC STABILITY: TRANSPORTATION INSECURITY
IN THE PAST 12 MONTHS, HAS THE LACK OF TRANSPORTATION KEPT YOU FROM MEDICAL APPOINTMENTS OR FROM GETTING MEDICATIONS?: NO

## 2025-05-12 SDOH — ECONOMIC STABILITY: TRANSPORTATION INSECURITY
IN THE PAST 12 MONTHS, HAS LACK OF TRANSPORTATION KEPT YOU FROM MEETINGS, WORK, OR FROM GETTING THINGS NEEDED FOR DAILY LIVING?: NO

## 2025-05-12 ASSESSMENT — PATIENT HEALTH QUESTIONNAIRE - PHQ9
SUM OF ALL RESPONSES TO PHQ QUESTIONS 1-9: 0
1. LITTLE INTEREST OR PLEASURE IN DOING THINGS: NOT AT ALL
2. FEELING DOWN, DEPRESSED OR HOPELESS: NOT AT ALL
SUM OF ALL RESPONSES TO PHQ QUESTIONS 1-9: 0
1. LITTLE INTEREST OR PLEASURE IN DOING THINGS: NOT AT ALL
2. FEELING DOWN, DEPRESSED OR HOPELESS: NOT AT ALL
SUM OF ALL RESPONSES TO PHQ9 QUESTIONS 1 & 2: 0
SUM OF ALL RESPONSES TO PHQ QUESTIONS 1-9: 0
SUM OF ALL RESPONSES TO PHQ QUESTIONS 1-9: 0

## 2025-05-12 NOTE — PROGRESS NOTES
[facility]  [facility]  [address]  [address]  [facility] [address]  Dept: [phone]      Clarita Webber is a 54 y.o. female who presents today for follow up on her  medical conditions as noted below.      Chief Complaint   Patient presents with    Insomnia       Patient Active Problem List:     Menopausal symptoms     Melanoma in situ (HCC)     Neoplasm of uncertain behavior of skin     Dysplastic nevus     Neoplasm of uncertain behavior     Past Medical History:   Diagnosis Date    History of gastroesophageal reflux (GERD)     very mild and intermittent    History of stress test     WNL    Kidney stones  7 2016    X 2 PASSED ON OWN    LA (lupus anticoagulant) disorder     Pt also has Anti-RO (SSA-SSB)    Melanoma (HCC) 2018    RT LOWER BACK    Melanoma (HCC)     Rheumatoid arthritis(714.0)     HAS ANTIBDIES BUT NO SYMPTOMS    Skin lesion of foot     UNDER NAIL LEFT GREAT TOE    Wears glasses     READING      Past Surgical History:   Procedure Laterality Date    ABDOMEN SURGERY  ,     C SECTION    ABDOMEN SURGERY  2007    JOSHUA WITH RSO    ABDOMEN SURGERY  2008    LAPAROTOMY WITH EVIE  AND LSO    ARM SURGERY Left 2023    LEFT ARM LESION EXCISION X 1 performed by NED Hassan MD at [facility] OR     SECTION  ,     FOOT SURGERY Left 2020    BIOPSY LEFT BIG TOE performed by NED Hassan MD at [facility] OR    HYSTERECTOMY (CERVIX STATUS UNKNOWN)  2007    Dr. Carrillo -JOSHUA + RSO    LAPAROTOMY  2008    Álvarez-EVIE and LSO     LEG BIOPSY EXCISION Right 2019    LEG LESION BIOPSY EXCISION - THIGH RIGHT/FOOT LESION BIOPSY EXCISION - SECOND TOE LEFT performed by NED Hassan MD at [facility] OR    OTHER SURGICAL HISTORY Left 2020    Biopsy Left Big Toe    OVARY REMOVAL      ID EXCISION TUMOR SOFT TISSUE BACK/FLANK SUBQ <3CM N/A 06/10/2019    Excision and biopsy upper

## (undated) DEVICE — SUTURE MCRYL SZ 4-0 L18IN ABSRB UD P-3 L13MM 3/8 CIR PRIM Y494G

## (undated) DEVICE — SVMMC POD PK

## (undated) DEVICE — STANDARD HYPODERMIC NEEDLE,POLYPROPYLENE HUB: Brand: MONOJECT

## (undated) DEVICE — INTENDED FOR TISSUE SEPARATION, AND OTHER PROCEDURES THAT REQUIRE A SHARP SURGICAL BLADE TO PUNCTURE OR CUT.: Brand: BARD-PARKER ® SAFETYLOCK CARBON RIB-BACK BLADES

## (undated) DEVICE — ELECTRODE PT RET AD L9FT HI MOIST COND ADH HYDRGEL CORDED

## (undated) DEVICE — PENCIL ES L3M BTTN SWCH HOLSTER W/ BLDE ELECTRD EDGE

## (undated) DEVICE — STERILE POLYISOPRENE POWDER-FREE SURGICAL GLOVES WITH EMOLLIENT COATING: Brand: PROTEXIS

## (undated) DEVICE — STERILE POLYISOPRENE POWDER-FREE SURGICAL GLOVES: Brand: PROTEXIS

## (undated) DEVICE — CONNECTOR,TUBING,5-IN-1,NON-STERILE: Brand: MEDLINE INDUSTRIES, INC.

## (undated) DEVICE — SUTURE 2-0 STRATAFIX MONOCRYL 45CM CT-1

## (undated) DEVICE — MHPB HAND AND FOOT PACK: Brand: MEDLINE INDUSTRIES, INC.

## (undated) DEVICE — MHPB GEN MINOR PACK: Brand: MEDLINE INDUSTRIES, INC.

## (undated) DEVICE — Z DISCONTINUED BY MEDLINE USE 2711682 TRAY SKIN PREP DRY W/ PREM GLV

## (undated) DEVICE — GARMENT,MEDLINE,DVT,INT,CALF,MED, GEN2: Brand: MEDLINE

## (undated) DEVICE — COVER,LIGHT HANDLE,FLX,2/PK: Brand: MEDLINE INDUSTRIES, INC.

## (undated) DEVICE — SUTURE MCRYL SZ 3-0 L18IN ABSRB UD L19MM PS-2 3/8 CIR PRIM Y497G

## (undated) DEVICE — CHLORAPREP 26ML ORANGE

## (undated) DEVICE — DRAPE,EENT,SPLIT,STERILE: Brand: MEDLINE

## (undated) DEVICE — GLOVE ORANGE PI 7 1/2   MSG9075

## (undated) DEVICE — SUTURE ETHLN SZ 4-0 L18IN NONABSORBABLE BLK L16MM PC-3 3/8 1864G

## (undated) DEVICE — SOLUTION SURG PREP ANTIMICROBIAL 4 OZ SKIN WND EXIDINE

## (undated) DEVICE — STRIP,CLOSURE,WOUND,MEDI-STRIP,1/2X4: Brand: MEDLINE

## (undated) DEVICE — BLADE WECK ST

## (undated) DEVICE — SUTURE MCRYL + SZ 5 0 L18IN ABSRB UD L13MM P 3 3 8 CIR PRIM MCP493G

## (undated) DEVICE — SOLUTION SCRB 4OZ 4% CHG H2O AIDED FOR PREOPERATIVE SKIN

## (undated) DEVICE — DALE ENDOTRACHEAL TUBE HOLDER, FITS ET TUBE SIZES 7.0MM - 10.0MM: Brand: DALE ENDOTRACHEAL TUBE HOLDER

## (undated) DEVICE — GLOVE ORANGE PI 7   MSG9070

## (undated) DEVICE — CONTAINER,SPECIMEN,4OZ,OR STRL: Brand: MEDLINE

## (undated) DEVICE — SOLUTION IV IRRIG POUR BRL 0.9% SODIUM CHL 2F7124

## (undated) DEVICE — ADHESIVE SKIN CLSR 0.7ML TOP DERMBND ADV

## (undated) DEVICE — SOLUTION IRRIG 1000ML 0.9% SOD CHL USP POUR PLAS BTL

## (undated) DEVICE — SOLUTION SURG PREP POV IOD 7.5% 4 OZ

## (undated) DEVICE — TOWEL,OR,DSP,ST,NATURAL,DLX,4/PK,20PK/CS: Brand: MEDLINE

## (undated) DEVICE — GLOVE SURG SZ 65 THK91MIL LTX FREE SYN POLYISOPRENE

## (undated) DEVICE — Z DISCONTINUED USE 2271144 DRAIN SURG W0.25XL18IN PRECUT UNIF WALL THICKNESS PENROSE

## (undated) DEVICE — Device

## (undated) DEVICE — PREMIUM DRY TRAY LF: Brand: MEDLINE INDUSTRIES, INC.

## (undated) DEVICE — SYRINGE MED 10ML LUERLOCK TIP W/O SFTY DISP

## (undated) DEVICE — BLADE ES ELASTOMERIC COAT INSUL DURABLE BEND UPTO 90DEG

## (undated) DEVICE — PACK PROCEDURE SURG GEN MIN SVMMC

## (undated) DEVICE — INTENDED FOR TISSUE SEPARATION, AND OTHER PROCEDURES THAT REQUIRE A SHARP SURGICAL BLADE TO PUNCTURE OR CUT.: Brand: BARD-PARKER ® CARBON RIB-BACK BLADES

## (undated) DEVICE — GOWN,PREVENTION PLUS,2XL,ST,22/CS: Brand: MEDLINE

## (undated) DEVICE — 3M™ STERI-STRIP™ BLEND TONE SKIN CLOSURES, B1557, TAN, 1/2 IN X 4 IN (12MM X 100MM), 6 STRIPS/ENVELOPE: Brand: 3M™ STERI-STRIP™

## (undated) DEVICE — SUTURE MCRYL SZ 0 L18IN ABSRB VLT MO-5 L31MM 1/2 CIR TAPR Y814G

## (undated) DEVICE — SUTURE CHROMIC GUT SZ 5-0 L18IN ABSRB BRN P-3 L13MM 3/8 CIR 687G

## (undated) DEVICE — BANDAGE GZ W2XL75IN ST RAYON POLY CNFRM STRTCH LTWT

## (undated) DEVICE — ARROWHEAD LOCAL PACK: Brand: MEDLINE INDUSTRIES, INC.

## (undated) DEVICE — SLEEVE TRAC VELC ROT FOR 3 PNT SHLDR DISTR SYS

## (undated) DEVICE — OCCLUSIVE GAUZE ROLL,3% BISMUTH TRIBROMOPHENATE IN PETROLATUM BLEND: Brand: XEROFORM

## (undated) DEVICE — GOWN,PREVENTION PLUS,XL,ST,24/CS: Brand: MEDLINE

## (undated) DEVICE — ELECTRODE ELECSURG NDL 2.8 INX7.2 CM COAT INSUL EDGE

## (undated) DEVICE — PAD,NON-ADHERENT,3X8,STERILE,LF,1/PK: Brand: MEDLINE

## (undated) DEVICE — GOWN,AURORA,NONRNF,XL,30/CS: Brand: MEDLINE

## (undated) DEVICE — LIQUIBAND RAPID ADHESIVE 36/CS 0.8ML: Brand: MEDLINE

## (undated) DEVICE — CONTROL SYRINGE LUER-LOCK TIP: Brand: MONOJECT

## (undated) DEVICE — SUTURE STL SZ 0 L18IN NONABSORBABLE UD TIE PRE-CUT MFIL DS26